# Patient Record
Sex: FEMALE | Race: WHITE | NOT HISPANIC OR LATINO | Employment: STUDENT | URBAN - METROPOLITAN AREA
[De-identification: names, ages, dates, MRNs, and addresses within clinical notes are randomized per-mention and may not be internally consistent; named-entity substitution may affect disease eponyms.]

---

## 2018-08-16 ENCOUNTER — OFFICE VISIT (OUTPATIENT)
Dept: FAMILY MEDICINE | Facility: CLINIC | Age: 18
End: 2018-08-16
Payer: COMMERCIAL

## 2018-08-16 ENCOUNTER — LAB VISIT (OUTPATIENT)
Dept: LAB | Facility: HOSPITAL | Age: 18
End: 2018-08-16
Attending: NURSE PRACTITIONER
Payer: COMMERCIAL

## 2018-08-16 VITALS
TEMPERATURE: 98 F | WEIGHT: 156.75 LBS | DIASTOLIC BLOOD PRESSURE: 78 MMHG | HEART RATE: 96 BPM | HEIGHT: 65 IN | BODY MASS INDEX: 26.12 KG/M2 | SYSTOLIC BLOOD PRESSURE: 126 MMHG

## 2018-08-16 DIAGNOSIS — Z00.00 ANNUAL PHYSICAL EXAM: Primary | ICD-10-CM

## 2018-08-16 DIAGNOSIS — F41.1 GAD (GENERALIZED ANXIETY DISORDER): ICD-10-CM

## 2018-08-16 DIAGNOSIS — Z00.00 ANNUAL PHYSICAL EXAM: ICD-10-CM

## 2018-08-16 LAB
ALBUMIN SERPL BCP-MCNC: 4.2 G/DL
ALP SERPL-CCNC: 71 U/L
ALT SERPL W/O P-5'-P-CCNC: 11 U/L
ANION GAP SERPL CALC-SCNC: 9 MMOL/L
AST SERPL-CCNC: 16 U/L
BASOPHILS # BLD AUTO: 0.04 K/UL
BASOPHILS NFR BLD: 0.8 %
BILIRUB SERPL-MCNC: 0.4 MG/DL
BUN SERPL-MCNC: 10 MG/DL
CALCIUM SERPL-MCNC: 9.6 MG/DL
CHLORIDE SERPL-SCNC: 108 MMOL/L
CHOLEST SERPL-MCNC: 206 MG/DL
CHOLEST/HDLC SERPL: 4 {RATIO}
CO2 SERPL-SCNC: 23 MMOL/L
CREAT SERPL-MCNC: 0.8 MG/DL
DIFFERENTIAL METHOD: NORMAL
EOSINOPHIL # BLD AUTO: 0.2 K/UL
EOSINOPHIL NFR BLD: 3.2 %
ERYTHROCYTE [DISTWIDTH] IN BLOOD BY AUTOMATED COUNT: 13.2 %
EST. GFR  (AFRICAN AMERICAN): >60 ML/MIN/1.73 M^2
EST. GFR  (NON AFRICAN AMERICAN): >60 ML/MIN/1.73 M^2
GLUCOSE SERPL-MCNC: 100 MG/DL
HCT VFR BLD AUTO: 41.3 %
HDLC SERPL-MCNC: 51 MG/DL
HDLC SERPL: 24.8 %
HGB BLD-MCNC: 13.6 G/DL
IMM GRANULOCYTES # BLD AUTO: 0.01 K/UL
IMM GRANULOCYTES NFR BLD AUTO: 0.2 %
LDLC SERPL CALC-MCNC: 146.2 MG/DL
LYMPHOCYTES # BLD AUTO: 2 K/UL
LYMPHOCYTES NFR BLD: 37.2 %
MCH RBC QN AUTO: 28.7 PG
MCHC RBC AUTO-ENTMCNC: 32.9 G/DL
MCV RBC AUTO: 87 FL
MONOCYTES # BLD AUTO: 0.4 K/UL
MONOCYTES NFR BLD: 7.9 %
NEUTROPHILS # BLD AUTO: 2.7 K/UL
NEUTROPHILS NFR BLD: 50.7 %
NONHDLC SERPL-MCNC: 155 MG/DL
NRBC BLD-RTO: 0 /100 WBC
PLATELET # BLD AUTO: 299 K/UL
PMV BLD AUTO: 9.4 FL
POTASSIUM SERPL-SCNC: 3.9 MMOL/L
PROT SERPL-MCNC: 7.4 G/DL
RBC # BLD AUTO: 4.74 M/UL
SODIUM SERPL-SCNC: 140 MMOL/L
TRIGL SERPL-MCNC: 44 MG/DL
WBC # BLD AUTO: 5.32 K/UL

## 2018-08-16 PROCEDURE — 99385 PREV VISIT NEW AGE 18-39: CPT | Mod: S$GLB,,, | Performed by: NURSE PRACTITIONER

## 2018-08-16 PROCEDURE — 80061 LIPID PANEL: CPT

## 2018-08-16 PROCEDURE — 80053 COMPREHEN METABOLIC PANEL: CPT

## 2018-08-16 PROCEDURE — 36415 COLL VENOUS BLD VENIPUNCTURE: CPT | Mod: PO

## 2018-08-16 PROCEDURE — 85025 COMPLETE CBC W/AUTO DIFF WBC: CPT

## 2018-08-16 PROCEDURE — 99999 PR PBB SHADOW E&M-NEW PATIENT-LVL III: CPT | Mod: PBBFAC,,, | Performed by: NURSE PRACTITIONER

## 2018-08-16 RX ORDER — ESCITALOPRAM OXALATE 5 MG/1
5 TABLET ORAL DAILY
Qty: 30 TABLET | Refills: 0 | Status: SHIPPED | OUTPATIENT
Start: 2018-08-16 | End: 2018-09-20 | Stop reason: ALTCHOICE
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
## 2018-09-20 ENCOUNTER — OFFICE VISIT (OUTPATIENT)
Dept: FAMILY MEDICINE | Facility: CLINIC | Age: 18
End: 2018-09-20
Payer: COMMERCIAL

## 2018-09-20 VITALS
DIASTOLIC BLOOD PRESSURE: 79 MMHG | SYSTOLIC BLOOD PRESSURE: 121 MMHG | HEART RATE: 73 BPM | WEIGHT: 151.88 LBS | TEMPERATURE: 98 F | BODY MASS INDEX: 25.3 KG/M2 | HEIGHT: 65 IN

## 2018-09-20 DIAGNOSIS — F32.A ANXIETY AND DEPRESSION: Primary | ICD-10-CM

## 2018-09-20 DIAGNOSIS — F41.9 ANXIETY AND DEPRESSION: Primary | ICD-10-CM

## 2018-09-20 PROCEDURE — 99214 OFFICE O/P EST MOD 30 MIN: CPT | Mod: S$GLB,,, | Performed by: NURSE PRACTITIONER

## 2018-09-20 PROCEDURE — 3008F BODY MASS INDEX DOCD: CPT | Mod: CPTII,S$GLB,, | Performed by: NURSE PRACTITIONER

## 2018-09-20 PROCEDURE — 99999 PR PBB SHADOW E&M-EST. PATIENT-LVL III: CPT | Mod: PBBFAC,,, | Performed by: NURSE PRACTITIONER

## 2018-09-20 RX ORDER — SERTRALINE HYDROCHLORIDE 50 MG/1
50 TABLET, FILM COATED ORAL DAILY
Qty: 30 TABLET | Refills: 0 | Status: SHIPPED | OUTPATIENT
Start: 2018-09-20 | End: 2018-10-19 | Stop reason: SDUPTHER

## 2018-09-20 NOTE — PROGRESS NOTES
Subjective:       Patient ID: Mary Jones is a 18 y.o. female.    Chief Complaint: Anxiety    Ms. Jones presents to the clinic today for one month follow up for anxiety.  She started Lexapro and felt this helped with anxiety, but depression has worsened.  She also felt the Lexapro caused a little lightheadedness.  She is still in therapy.  She denies suicidal ideation.  She is exercising by walking.  She has no other complaints today.      Review of Systems   Respiratory: Negative for cough and shortness of breath.    Cardiovascular: Negative for chest pain, palpitations and leg swelling.   Gastrointestinal: Negative for abdominal pain, constipation and diarrhea.   Psychiatric/Behavioral: Positive for dysphoric mood. Negative for suicidal ideas. The patient is nervous/anxious (improved).        Objective:      Physical Exam   Constitutional: She is oriented to person, place, and time. She appears well-nourished. No distress.   HENT:   Head: Normocephalic and atraumatic.   Right Ear: External ear normal.   Left Ear: External ear normal.   Mouth/Throat: Oropharynx is clear and moist. No oropharyngeal exudate.   Eyes: Pupils are equal, round, and reactive to light. Right eye exhibits no discharge. Left eye exhibits no discharge.   Cardiovascular: Normal rate and regular rhythm. Exam reveals no gallop and no friction rub.   No murmur heard.  Pulmonary/Chest: Effort normal and breath sounds normal. No respiratory distress. She has no wheezes. She has no rales.   Neurological: She is alert and oriented to person, place, and time. Coordination normal.   Skin: Skin is warm and dry.   Psychiatric: She has a normal mood and affect. Her behavior is normal. Thought content normal.   Vitals reviewed.          Current Outpatient Medications:     sertraline (ZOLOFT) 50 MG tablet, Take 1 tablet (50 mg total) by mouth once daily., Disp: 30 tablet, Rfl: 0  Assessment:       1. Anxiety and depression        Plan:        Anxiety and depression  Due to worsening depression will switch to Zoloft.  RTC 1 month.  -     sertraline (ZOLOFT) 50 MG tablet; Take 1 tablet (50 mg total) by mouth once daily.  Dispense: 30 tablet; Refill: 0

## 2018-10-19 ENCOUNTER — OFFICE VISIT (OUTPATIENT)
Dept: FAMILY MEDICINE | Facility: CLINIC | Age: 18
End: 2018-10-19
Payer: COMMERCIAL

## 2018-10-19 VITALS
BODY MASS INDEX: 25.2 KG/M2 | HEIGHT: 65 IN | TEMPERATURE: 98 F | WEIGHT: 151.25 LBS | DIASTOLIC BLOOD PRESSURE: 73 MMHG | SYSTOLIC BLOOD PRESSURE: 108 MMHG | HEART RATE: 91 BPM

## 2018-10-19 DIAGNOSIS — F41.9 ANXIETY AND DEPRESSION: ICD-10-CM

## 2018-10-19 DIAGNOSIS — F32.A ANXIETY AND DEPRESSION: ICD-10-CM

## 2018-10-19 PROCEDURE — 99213 OFFICE O/P EST LOW 20 MIN: CPT | Mod: S$GLB,,, | Performed by: NURSE PRACTITIONER

## 2018-10-19 PROCEDURE — 3008F BODY MASS INDEX DOCD: CPT | Mod: CPTII,S$GLB,, | Performed by: NURSE PRACTITIONER

## 2018-10-19 PROCEDURE — 99999 PR PBB SHADOW E&M-EST. PATIENT-LVL III: CPT | Mod: PBBFAC,,, | Performed by: NURSE PRACTITIONER

## 2018-10-19 RX ORDER — SERTRALINE HYDROCHLORIDE 50 MG/1
50 TABLET, FILM COATED ORAL DAILY
Qty: 90 TABLET | Refills: 3 | Status: SHIPPED | OUTPATIENT
Start: 2018-10-19 | End: 2018-12-12

## 2018-10-19 NOTE — PROGRESS NOTES
Subjective:       Patient ID: Mary Jones is a 18 y.o. female.    Chief Complaint: Anxiety    Ms. Jones presents to the clinic today for one month follow up after starting sertraline for anxiety and depression.  She reports she is feeling much better with this medication than the escitalopram.  She states anxiety and depression are both controlled.  She gets lightheaded occasionally since starting the medication but she can control this by getting up more slowly.  She has no other side effects.  She would like to continue this dosage. Her therapist can no longer see her and she is searching for a new therapist.  Denies vaccines today.      Review of Systems   Constitutional: Negative for chills and fever.   Respiratory: Negative for cough and shortness of breath.    Cardiovascular: Negative for chest pain, palpitations and leg swelling.   Psychiatric/Behavioral: Negative for dysphoric mood. The patient is not nervous/anxious.        Objective:      Physical Exam   Constitutional: She is oriented to person, place, and time. She appears well-developed and well-nourished. No distress.   HENT:   Head: Normocephalic and atraumatic.   Right Ear: External ear normal.   Left Ear: External ear normal.   Eyes: Conjunctivae and EOM are normal.   Cardiovascular: Normal rate, regular rhythm and normal heart sounds. Exam reveals no gallop and no friction rub.   No murmur heard.  Pulmonary/Chest: Effort normal and breath sounds normal. No stridor. No respiratory distress. She has no wheezes.   Musculoskeletal: Normal range of motion.   Neurological: She is alert and oriented to person, place, and time.   Skin: Skin is warm and dry.   Psychiatric: She has a normal mood and affect. Her behavior is normal.   Vitals reviewed.          Current Outpatient Medications:     sertraline (ZOLOFT) 50 MG tablet, Take 1 tablet (50 mg total) by mouth once daily., Disp: 90 tablet, Rfl: 3  Assessment:       1. Anxiety and depression         Plan:       Anxiety and depression  Controlled, continue current medication.  Follow up 6mos--can consider weaning off medication or stay on this long term.  -     sertraline (ZOLOFT) 50 MG tablet; Take 1 tablet (50 mg total) by mouth once daily.  Dispense: 90 tablet; Refill: 3

## 2018-12-10 ENCOUNTER — TELEPHONE (OUTPATIENT)
Dept: FAMILY MEDICINE | Facility: CLINIC | Age: 18
End: 2018-12-10

## 2018-12-10 NOTE — TELEPHONE ENCOUNTER
----- Message from Alison Zuniga sent at 12/10/2018  4:40 PM CST -----    Type:  Same Day Appointment Request    Caller is requesting a same day appointment.  Caller declined first available appointment listed below.      Name of Caller:pt  When is the first available appointment?   12/19/2018  Symptoms:  Med    Side  Affects  For zoloft  Best Call Back Number:  [phone]  Additional Information:     Pt  Stated she only  wants to  See this  No  only

## 2018-12-10 NOTE — TELEPHONE ENCOUNTER
Patient has complaints of diarrhea and worsening depression that has gotten worse over time. Patient states she did not stop taking the medication.   Advised patient will speak to facundo and f/u   Appointment available on Wednesday left message for patient to return call

## 2018-12-11 NOTE — TELEPHONE ENCOUNTER
Spoke to patient. Patient states she does not have suicidal thoughts patient declines to come in today to see Miroslava appointment scheduled with facundo ANDINO for 12/12

## 2018-12-12 ENCOUNTER — OFFICE VISIT (OUTPATIENT)
Dept: FAMILY MEDICINE | Facility: CLINIC | Age: 18
End: 2018-12-12
Payer: COMMERCIAL

## 2018-12-12 VITALS
BODY MASS INDEX: 25.2 KG/M2 | HEIGHT: 65 IN | DIASTOLIC BLOOD PRESSURE: 79 MMHG | SYSTOLIC BLOOD PRESSURE: 122 MMHG | HEART RATE: 98 BPM | WEIGHT: 151.25 LBS | TEMPERATURE: 98 F

## 2018-12-12 DIAGNOSIS — F41.9 ANXIETY AND DEPRESSION: ICD-10-CM

## 2018-12-12 DIAGNOSIS — F32.A ANXIETY AND DEPRESSION: ICD-10-CM

## 2018-12-12 PROCEDURE — 3008F BODY MASS INDEX DOCD: CPT | Mod: CPTII,S$GLB,, | Performed by: NURSE PRACTITIONER

## 2018-12-12 PROCEDURE — 99999 PR PBB SHADOW E&M-EST. PATIENT-LVL IV: CPT | Mod: PBBFAC,,, | Performed by: NURSE PRACTITIONER

## 2018-12-12 PROCEDURE — 99214 OFFICE O/P EST MOD 30 MIN: CPT | Mod: S$GLB,,, | Performed by: NURSE PRACTITIONER

## 2018-12-12 RX ORDER — SERTRALINE HYDROCHLORIDE 100 MG/1
100 TABLET, FILM COATED ORAL DAILY
Qty: 90 TABLET | Refills: 3 | Status: SHIPPED | OUTPATIENT
Start: 2018-12-12 | End: 2019-01-09

## 2018-12-12 NOTE — PROGRESS NOTES
Subjective:       Patient ID: Mary Jones is a 18 y.o. female.    Chief Complaint: Depression    Ms. Jones presents to the clinic today for worsening depression over the past month.  She reports anxiety is controlled but depression has worsened.  No inciting event.  She has had thought of hurting self but would never act on it.  She is staying busy with work and school.  She tried to find a therapist in [place] but was unsuccessful.  She is willing to drive to main campus if needed.      Review of Systems   Constitutional: Negative for chills and fever.   Respiratory: Negative for cough and shortness of breath.    Cardiovascular: Negative for chest pain, palpitations and leg swelling.   Gastrointestinal: Negative for abdominal pain, constipation and diarrhea.   Psychiatric/Behavioral: Positive for dysphoric mood. The patient is not nervous/anxious.        Objective:      Physical Exam   Constitutional: She is oriented to person, place, and time. She appears well-nourished. No distress.   HENT:   Head: Normocephalic and atraumatic.   Right Ear: External ear normal.   Left Ear: External ear normal.   Mouth/Throat: Oropharynx is clear and moist. No oropharyngeal exudate.   Eyes: Right eye exhibits no discharge. Left eye exhibits no discharge.   Cardiovascular: Normal rate and regular rhythm. Exam reveals no gallop and no friction rub.   No murmur heard.  Pulmonary/Chest: Effort normal and breath sounds normal. No respiratory distress. She has no wheezes. She has no rales.   Neurological: She is alert and oriented to person, place, and time. Coordination normal.   Skin: Skin is warm and dry.   Psychiatric: Her behavior is normal. Thought content normal. Her mood appears not anxious. She exhibits a depressed mood (tearful). She expresses no suicidal plans.   Vitals reviewed.          Current Outpatient Medications:     sertraline (ZOLOFT) 100 MG tablet, Take 1 tablet (100 mg total) by mouth once daily., Disp:  90 tablet, Rfl: 3  Assessment:       1. Anxiety and depression        Plan:       Anxiety and depression  She will seek therapy at main campus.  Increase Zoloft, RTC 4 wk or sooner PRN.  -     sertraline (ZOLOFT) 100 MG tablet; Take 1 tablet (100 mg total) by mouth once daily.  Dispense: 90 tablet; Refill: 3  -     Ambulatory referral to Psychology

## 2019-01-09 ENCOUNTER — OFFICE VISIT (OUTPATIENT)
Dept: FAMILY MEDICINE | Facility: CLINIC | Age: 19
End: 2019-01-09
Payer: COMMERCIAL

## 2019-01-09 VITALS
TEMPERATURE: 98 F | SYSTOLIC BLOOD PRESSURE: 104 MMHG | HEART RATE: 78 BPM | BODY MASS INDEX: 24.75 KG/M2 | HEIGHT: 65 IN | WEIGHT: 148.56 LBS | DIASTOLIC BLOOD PRESSURE: 74 MMHG

## 2019-01-09 DIAGNOSIS — F41.9 ANXIETY AND DEPRESSION: Primary | ICD-10-CM

## 2019-01-09 DIAGNOSIS — F32.A ANXIETY AND DEPRESSION: Primary | ICD-10-CM

## 2019-01-09 PROCEDURE — 99213 PR OFFICE/OUTPT VISIT, EST, LEVL III, 20-29 MIN: ICD-10-PCS | Mod: S$GLB,,, | Performed by: NURSE PRACTITIONER

## 2019-01-09 PROCEDURE — 3008F BODY MASS INDEX DOCD: CPT | Mod: CPTII,S$GLB,, | Performed by: NURSE PRACTITIONER

## 2019-01-09 PROCEDURE — 99999 PR PBB SHADOW E&M-EST. PATIENT-LVL III: CPT | Mod: PBBFAC,,, | Performed by: NURSE PRACTITIONER

## 2019-01-09 PROCEDURE — 99213 OFFICE O/P EST LOW 20 MIN: CPT | Mod: S$GLB,,, | Performed by: NURSE PRACTITIONER

## 2019-01-09 PROCEDURE — 3008F PR BODY MASS INDEX (BMI) DOCUMENTED: ICD-10-PCS | Mod: CPTII,S$GLB,, | Performed by: NURSE PRACTITIONER

## 2019-01-09 PROCEDURE — 99999 PR PBB SHADOW E&M-EST. PATIENT-LVL III: ICD-10-PCS | Mod: PBBFAC,,, | Performed by: NURSE PRACTITIONER

## 2019-01-09 RX ORDER — FLUOXETINE 10 MG/1
CAPSULE ORAL
Qty: 60 CAPSULE | Refills: 2 | Status: SHIPPED | OUTPATIENT
Start: 2019-01-09 | End: 2019-02-06

## 2019-01-09 NOTE — PROGRESS NOTES
Subjective:       Patient ID: Mary Jones is a 18 y.o. female.    Chief Complaint: Follow-up    Ms. Jones presents to the clinic today for follow up for anxiety and depression.  She stopped Zoloft one week ago due to side effects of diarrhea, trouble sleeping.  She would like to try a different medication.  She felt the Zoloft helped her anxiety but not depression.  Currently denies SI.  She has seen a couple therapists but has not chosen one long term.  Started school this week--taking 18 hours this semester.      Review of Systems   Gastrointestinal: Positive for diarrhea (on zoloft). Negative for constipation.   Psychiatric/Behavioral: Positive for dysphoric mood and sleep disturbance. Negative for self-injury and suicidal ideas. The patient is nervous/anxious.        Objective:      Physical Exam   Constitutional: She is oriented to person, place, and time. She appears well-nourished. No distress.   HENT:   Head: Normocephalic and atraumatic.   Right Ear: External ear normal.   Left Ear: External ear normal.   Eyes: Right eye exhibits no discharge. Left eye exhibits no discharge.   Cardiovascular: Normal rate and regular rhythm. Exam reveals no gallop and no friction rub.   No murmur heard.  Pulmonary/Chest: Effort normal and breath sounds normal. No respiratory distress. She has no wheezes. She has no rales.   Neurological: She is alert and oriented to person, place, and time. Coordination normal.   Skin: Skin is warm and dry.   Psychiatric: She has a normal mood and affect. Her behavior is normal. Thought content normal.   Vitals reviewed.          Current Outpatient Medications:     FLUoxetine 10 MG capsule, Take 1 cap by mouth daily x 1 week, then increase to 2 caps by mouth daily, Disp: 60 capsule, Rfl: 2  Assessment:       1. Anxiety and depression        Plan:       Anxiety and depression  Start:  -     FLUoxetine 10 MG capsule; Take 1 cap by mouth daily x 1 week, then increase to 2 caps by  mouth daily  Dispense: 60 capsule; Refill: 2  RTC 4 weeks.

## 2019-02-06 ENCOUNTER — OFFICE VISIT (OUTPATIENT)
Dept: FAMILY MEDICINE | Facility: CLINIC | Age: 19
End: 2019-02-06
Payer: COMMERCIAL

## 2019-02-06 VITALS
WEIGHT: 148.13 LBS | SYSTOLIC BLOOD PRESSURE: 107 MMHG | TEMPERATURE: 98 F | DIASTOLIC BLOOD PRESSURE: 73 MMHG | HEIGHT: 65 IN | BODY MASS INDEX: 24.68 KG/M2 | HEART RATE: 96 BPM

## 2019-02-06 DIAGNOSIS — F32.A ANXIETY AND DEPRESSION: ICD-10-CM

## 2019-02-06 DIAGNOSIS — F41.9 ANXIETY AND DEPRESSION: ICD-10-CM

## 2019-02-06 PROCEDURE — 99999 PR PBB SHADOW E&M-EST. PATIENT-LVL III: ICD-10-PCS | Mod: PBBFAC,,, | Performed by: NURSE PRACTITIONER

## 2019-02-06 PROCEDURE — 3008F PR BODY MASS INDEX (BMI) DOCUMENTED: ICD-10-PCS | Mod: CPTII,S$GLB,, | Performed by: NURSE PRACTITIONER

## 2019-02-06 PROCEDURE — 99214 OFFICE O/P EST MOD 30 MIN: CPT | Mod: S$GLB,,, | Performed by: NURSE PRACTITIONER

## 2019-02-06 PROCEDURE — 3008F BODY MASS INDEX DOCD: CPT | Mod: CPTII,S$GLB,, | Performed by: NURSE PRACTITIONER

## 2019-02-06 PROCEDURE — 99214 PR OFFICE/OUTPT VISIT, EST, LEVL IV, 30-39 MIN: ICD-10-PCS | Mod: S$GLB,,, | Performed by: NURSE PRACTITIONER

## 2019-02-06 PROCEDURE — 99999 PR PBB SHADOW E&M-EST. PATIENT-LVL III: CPT | Mod: PBBFAC,,, | Performed by: NURSE PRACTITIONER

## 2019-02-06 RX ORDER — FLUOXETINE HYDROCHLORIDE 40 MG/1
40 CAPSULE ORAL DAILY
Qty: 30 CAPSULE | Refills: 2 | Status: SHIPPED | OUTPATIENT
Start: 2019-02-06 | End: 2019-04-22

## 2019-02-06 NOTE — PROGRESS NOTES
Subjective:       Patient ID: Mary Jones is a 18 y.o. female.    Chief Complaint: Follow-up    Ms. Jones presents to the clinic today for four week follow up for anxiety and depression.  She is doing well with the Prozac but feels the dosage could be increased.  She still has some anxiety and depression. No side effects from the medication other than occasional lightheadedness but she had this before taking the medicine.  She denies SI/HI.  She is meeting with a new therapist coming up soon.  She is still going to school in [place].  She will be moving into a studio apartment soon, moving out of her parents' house for the first time.      Review of Systems   Constitutional: Negative for chills and fever.   Respiratory: Negative for cough and shortness of breath.    Cardiovascular: Negative for chest pain, palpitations and leg swelling.   Psychiatric/Behavioral: Positive for dysphoric mood. Negative for self-injury and suicidal ideas. The patient is nervous/anxious.        Objective:      Physical Exam   Constitutional: She is oriented to person, place, and time. She appears well-developed and well-nourished. No distress.   HENT:   Head: Normocephalic and atraumatic.   Right Ear: External ear normal.   Left Ear: External ear normal.   Eyes: Conjunctivae and EOM are normal.   Cardiovascular: Normal rate, regular rhythm and normal heart sounds. Exam reveals no gallop and no friction rub.   No murmur heard.  Pulmonary/Chest: Effort normal and breath sounds normal. No stridor. No respiratory distress. She has no wheezes. She has no rales.   Musculoskeletal: Normal range of motion.   Neurological: She is alert and oriented to person, place, and time.   Skin: Skin is warm and dry.   Psychiatric: She has a normal mood and affect. Her behavior is normal.   Vitals reviewed.          Current Outpatient Medications:     FLUoxetine 40 MG capsule, Take 1 capsule (40 mg total) by mouth once daily., Disp: 30  capsule, Rfl: 2  Assessment:       1. Anxiety and depression        Plan:       Anxiety and depression  Increase:  -     FLUoxetine 40 MG capsule; Take 1 capsule (40 mg total) by mouth once daily.  Dispense: 30 capsule; Refill: 2  RTC 4-6 weeks

## 2019-03-21 ENCOUNTER — OFFICE VISIT (OUTPATIENT)
Dept: FAMILY MEDICINE | Facility: CLINIC | Age: 19
End: 2019-03-21
Payer: COMMERCIAL

## 2019-03-21 VITALS
HEIGHT: 65 IN | WEIGHT: 154.56 LBS | DIASTOLIC BLOOD PRESSURE: 76 MMHG | HEART RATE: 83 BPM | BODY MASS INDEX: 25.75 KG/M2 | TEMPERATURE: 98 F | SYSTOLIC BLOOD PRESSURE: 113 MMHG

## 2019-03-21 DIAGNOSIS — Z28.39 IMMUNIZATION DEFICIENCY: ICD-10-CM

## 2019-03-21 DIAGNOSIS — F32.A ANXIETY AND DEPRESSION: Primary | ICD-10-CM

## 2019-03-21 DIAGNOSIS — F41.9 ANXIETY AND DEPRESSION: Primary | ICD-10-CM

## 2019-03-21 PROCEDURE — 90651 HPV VACCINE 9-VALENT 3 DOSE IM: ICD-10-PCS | Mod: S$GLB,,, | Performed by: NURSE PRACTITIONER

## 2019-03-21 PROCEDURE — 99999 PR PBB SHADOW E&M-EST. PATIENT-LVL IV: ICD-10-PCS | Mod: PBBFAC,,, | Performed by: NURSE PRACTITIONER

## 2019-03-21 PROCEDURE — 3008F PR BODY MASS INDEX (BMI) DOCUMENTED: ICD-10-PCS | Mod: CPTII,S$GLB,, | Performed by: NURSE PRACTITIONER

## 2019-03-21 PROCEDURE — 3008F BODY MASS INDEX DOCD: CPT | Mod: CPTII,S$GLB,, | Performed by: NURSE PRACTITIONER

## 2019-03-21 PROCEDURE — 99999 PR PBB SHADOW E&M-EST. PATIENT-LVL IV: CPT | Mod: PBBFAC,,, | Performed by: NURSE PRACTITIONER

## 2019-03-21 PROCEDURE — 90471 IMMUNIZATION ADMIN: CPT | Mod: S$GLB,,, | Performed by: NURSE PRACTITIONER

## 2019-03-21 PROCEDURE — 90471 HPV VACCINE 9-VALENT 3 DOSE IM: ICD-10-PCS | Mod: S$GLB,,, | Performed by: NURSE PRACTITIONER

## 2019-03-21 PROCEDURE — 99213 PR OFFICE/OUTPT VISIT, EST, LEVL III, 20-29 MIN: ICD-10-PCS | Mod: 25,S$GLB,, | Performed by: NURSE PRACTITIONER

## 2019-03-21 PROCEDURE — 90651 9VHPV VACCINE 2/3 DOSE IM: CPT | Mod: S$GLB,,, | Performed by: NURSE PRACTITIONER

## 2019-03-21 PROCEDURE — 99213 OFFICE O/P EST LOW 20 MIN: CPT | Mod: 25,S$GLB,, | Performed by: NURSE PRACTITIONER

## 2019-03-21 RX ORDER — QUETIAPINE FUMARATE 50 MG/1
50 TABLET, FILM COATED ORAL NIGHTLY
Qty: 30 TABLET | Refills: 11 | Status: SHIPPED | OUTPATIENT
Start: 2019-03-21 | End: 2019-03-28

## 2019-03-21 NOTE — PROGRESS NOTES
"Subjective:       Patient ID: Mary Jones is a 18 y.o. female.    Chief Complaint: Anxiety    Ms. Jones presents to the clinic today for follow up for anxiety and depression.  She feels the fluoxetine helps well with her anxiety but still not helping with the depression.  Denies SI/HI.  She is seeing a psychotherapist who thinks she may have bipolar disorder. She admits to fluctuating moods and these can change quickly over one day or several days. She will be "high" for a couple days and then crash.  No impulsive shopping/gambling.  She does have "intrusive thoughts".  She wants to see if she can get a definitive diagnosis.      Review of Systems   Constitutional: Negative for chills and fever.   Respiratory: Negative for cough and shortness of breath.    Cardiovascular: Negative for chest pain, palpitations and leg swelling.   Psychiatric/Behavioral: Positive for dysphoric mood. Negative for self-injury and suicidal ideas. The patient is not nervous/anxious.        Objective:      Physical Exam   Constitutional: She is oriented to person, place, and time. She appears well-developed and well-nourished. No distress.   HENT:   Head: Normocephalic and atraumatic.   Right Ear: External ear normal.   Left Ear: External ear normal.   Eyes: EOM are normal.   Cardiovascular: Normal rate.   Pulmonary/Chest: Effort normal.   Musculoskeletal: Normal range of motion.   Neurological: She is alert and oriented to person, place, and time.   Skin: Skin is warm and dry.   Psychiatric: She has a normal mood and affect. Her behavior is normal.   Vitals reviewed.          Current Outpatient Medications:     FLUoxetine 40 MG capsule, Take 1 capsule (40 mg total) by mouth once daily., Disp: 30 capsule, Rfl: 2    QUEtiapine (SEROQUEL) 50 MG tablet, Take 1 tablet (50 mg total) by mouth every evening., Disp: 30 tablet, Rfl: 11  Assessment:       1. Anxiety and depression    2. Immunization deficiency        Plan:       Anxiety " and depression  Continue fluoxetine, add quetiapine.  Handout given with possible side effects.  -     QUEtiapine (SEROQUEL) 50 MG tablet; Take 1 tablet (50 mg total) by mouth every evening.  Dispense: 30 tablet; Refill: 11  -     Ambulatory referral to Psychiatry    Immunization deficiency  -     (In Office Administered) HPV Vaccine (9-Valent) (3 Dose) (IM); Future  -     (In Office Administered) HPV Vaccine (9-Valent) (3 Dose) (IM); Future  -     (In Office Administered) HPV Vaccine (9-Valent) (3 Dose) (IM)      Establish care 6 mos with PCP

## 2019-03-21 NOTE — PATIENT INSTRUCTIONS
Quetiapine tablets  What is this medicine?  QUETIAPINE (yinka villalta) is an antipsychotic. It is used to treat schizophrenia and bipolar disorder, also known as manic-depression.  How should I use this medicine?  Take this medicine by mouth. Swallow it with a drink of water. Follow the directions on the prescription label. If it upsets your stomach you can take it with food. Take your medicine at regular intervals. Do not take it more often than directed. Do not stop taking except on the advice of your doctor or health care professional.  A special MedGuide will be given to you by the pharmacist with each prescription and refill. Be sure to read this information carefully each time.  Talk to your pediatrician regarding the use of this medicine in children. While this drug may be prescribed for children as young as 10 years for selected conditions, precautions do apply.  Patients over age 65 years may have a stronger reaction to this medicine and need smaller doses.  What side effects may I notice from receiving this medicine?  Side effects that you should report to your doctor or health care professional as soon as possible:  · allergic reactions like skin rash, itching or hives, swelling of the face, lips, or tongue  · difficulty swallowing  · fast or irregular heartbeat  · fever or chills, sore throat  · fever with rash, swollen lymph nodes, or swelling of the face  · increased hunger or thirst  · increased urination  · problems with balance, talking, walking  · seizures  · stiff muscles  · suicidal thoughts or other mood changes  · uncontrollable head, mouth, neck, arm, or leg movements  · unusually weak or tired  Side effects that usually do not require medical attention (report to your doctor or health care professional if they continue or are bothersome):  · change in sex drive or performance  · constipation  · drowsy or dizzy  · dry mouth  · stomach upset  · weight gain  What may interact with this  medicine?  Do not take this medicine with any of the following medications:  · certain medicines for fungal infections like fluconazole, itraconazole, ketoconazole, posaconazole, voriconazole  · cisapride  · dofetilide  · dronedarone  · droperidol  · grepafloxacin  · halofantrine  · phenothiazines like chlorpromazine, mesoridazine, thioridazine  · pimozide  · sparfloxacin  · ziprasidone  This medicine may also interact with the following medications:  · alcohol  · antiviral medicines for HIV or AIDS  · certain medicines for blood pressure  · certain medicines for depression, anxiety, or psychotic disturbances like haloperidol, lorazepam  · certain medicines for diabetes  · certain medicines for Parkinson's disease  · certain medicines for seizures like carbamazepine, phenobarbital, phenytoin  · cimetidine  · erythromycin  · other medicines that prolong the QT interval (cause an abnormal heart rhythm)  · rifampin  · steroid medicines like prednisone or cortisone  What if I miss a dose?  If you miss a dose, take it as soon as you can. If it is almost time for your next dose, take only that dose. Do not take double or extra doses.  Where should I keep my medicine?  Keep out of the reach of children.  Store at room temperature between 15 and 30 degrees C (59 and 86 degrees F). Throw away any unused medicine after the expiration date.  What should I tell my health care provider before I take this medicine?  They need to know if you have any of these conditions:  · brain tumor or head injury  · breast cancer  · cataracts  · diabetes  · difficulty swallowing  · heart disease  · kidney disease  · liver disease  · low blood counts, like low white cell, platelet, or red cell counts  · low blood pressure or dizziness when standing up  · Parkinson's disease  · previous heart attack  · seizures  · suicidal thoughts, plans, or attempt by you or a family member  · thyroid disease  · an unusual or allergic reaction to quetiapine,  other medicines, foods, dyes, or preservatives  · pregnant or trying to get pregnant  · breast-feeding  What should I watch for while using this medicine?  Visit your doctor or health care professional for regular checks on your progress. It may be several weeks before you see the full effects of this medicine.  Your health care provider may suggest that you have your eyes examined prior to starting this medicine, and every 6 months thereafter.  If you have been taking this medicine regularly for some time, do not suddenly stop taking it. You must gradually reduce the dose or your symptoms may get worse. Ask your doctor or health care professional for advice.  Patients and their families should watch out for worsening depression or thoughts of suicide. Also watch out for sudden or severe changes in feelings such as feeling anxious, agitated, panicky, irritable, hostile, aggressive, impulsive, severely restless, overly excited and hyperactive, or not being able to sleep. If this happens, especially at the beginning of antidepressant treatment or after a change in dose, call your health care professional.  You may get dizzy or drowsy. Do not drive, use machinery, or do anything that needs mental alertness until you know how this medicine affects you. Do not stand or sit up quickly, especially if you are an older patient. This reduces the risk of dizzy or fainting spells. Alcohol can increase dizziness and drowsiness. Avoid alcoholic drinks.  Do not treat yourself for colds, diarrhea or allergies. Ask your doctor or health care professional for advice, some ingredients may increase possible side effects.  This medicine can reduce the response of your body to heat or cold. Dress warm in cold weather and stay hydrated in hot weather. If possible, avoid extreme temperatures like saunas, hot tubs, very hot or cold showers, or activities that can cause dehydration such as vigorous exercise.  NOTE:This sheet is a summary. It  may not cover all possible information. If you have questions about this medicine, talk to your doctor, pharmacist, or health care provider. Copyright© 2017 [employer]

## 2019-03-21 NOTE — PROGRESS NOTES
Administered hpv vaccine IM. Patient tolerated well. No bleeding at insertion site noted. Pain scale 0/10 with injection. 2 patient identifiers used (name, ), aseptic technique maintained.

## 2019-03-28 ENCOUNTER — OFFICE VISIT (OUTPATIENT)
Dept: PSYCHIATRY | Facility: CLINIC | Age: 19
End: 2019-03-28
Payer: COMMERCIAL

## 2019-03-28 ENCOUNTER — PATIENT MESSAGE (OUTPATIENT)
Dept: PSYCHIATRY | Facility: CLINIC | Age: 19
End: 2019-03-28

## 2019-03-28 ENCOUNTER — TELEPHONE (OUTPATIENT)
Dept: FAMILY MEDICINE | Facility: CLINIC | Age: 19
End: 2019-03-28

## 2019-03-28 ENCOUNTER — LAB VISIT (OUTPATIENT)
Dept: LAB | Facility: HOSPITAL | Age: 19
End: 2019-03-28
Attending: PSYCHOLOGIST
Payer: COMMERCIAL

## 2019-03-28 VITALS
BODY MASS INDEX: 25.42 KG/M2 | DIASTOLIC BLOOD PRESSURE: 76 MMHG | HEIGHT: 65 IN | WEIGHT: 152.56 LBS | HEART RATE: 84 BPM | SYSTOLIC BLOOD PRESSURE: 112 MMHG | OXYGEN SATURATION: 98 % | TEMPERATURE: 98 F

## 2019-03-28 DIAGNOSIS — F31.31 BIPOLAR AFFECTIVE DISORDER, CURRENTLY DEPRESSED, MILD: ICD-10-CM

## 2019-03-28 DIAGNOSIS — F31.31 BIPOLAR AFFECTIVE DISORDER, CURRENTLY DEPRESSED, MILD: Primary | ICD-10-CM

## 2019-03-28 PROBLEM — F31.30 BIPOLAR DISORDER CURRENT EPISODE DEPRESSED: Status: ACTIVE | Noted: 2019-03-28

## 2019-03-28 LAB
AMP D-AMPHETAMINE 1000 NG/ML: NEGATIVE
BAR SECOBARBITAL 300 NG/ML: NEGATIVE
BUP BUPRENORPHINE 10 NG/ML: NEGATIVE
BZO OXAZEPAM 300 NG/ML: NEGATIVE
COC BENZOYLECGONINE 300 NG/ML: NEGATIVE
MAMP D-METHAMPHETAMINE 1000 NG/ML: NEGATIVE
MOP MORPHINE 300 NG/ML: NEGATIVE
MTD METHADONE 300 NG/ML: NEGATIVE
QXY OXYCODONE 100 NG/ML: NEGATIVE
THC 11-NOR-9-TETRAHYDROCANNABINOL-9-CARBOXYLIC ACID: POSITIVE
TSH SERPL DL<=0.005 MIU/L-ACNC: 1.59 UIU/ML (ref 0.4–4)

## 2019-03-28 PROCEDURE — 36415 COLL VENOUS BLD VENIPUNCTURE: CPT | Mod: PO

## 2019-03-28 PROCEDURE — 90791 PSYCH DIAGNOSTIC EVALUATION: CPT | Mod: S$GLB,,, | Performed by: PSYCHOLOGIST

## 2019-03-28 PROCEDURE — 90791 PR PSYCHIATRIC DIAGNOSTIC EVALUATION: ICD-10-PCS | Mod: S$GLB,,, | Performed by: PSYCHOLOGIST

## 2019-03-28 PROCEDURE — 99999 PR PBB SHADOW E&M-EST. PATIENT-LVL III: CPT | Mod: PBBFAC,,, | Performed by: PSYCHOLOGIST

## 2019-03-28 PROCEDURE — 80307 DRUG TEST PRSMV CHEM ANLYZR: CPT

## 2019-03-28 PROCEDURE — 84443 ASSAY THYROID STIM HORMONE: CPT

## 2019-03-28 PROCEDURE — 99999 PR PBB SHADOW E&M-EST. PATIENT-LVL III: ICD-10-PCS | Mod: PBBFAC,,, | Performed by: PSYCHOLOGIST

## 2019-03-28 RX ORDER — TRAZODONE HYDROCHLORIDE 100 MG/1
TABLET ORAL
Qty: 30 TABLET | Refills: 1 | Status: SHIPPED | OUTPATIENT
Start: 2019-03-28 | End: 2019-04-22 | Stop reason: SDUPTHER

## 2019-03-28 RX ORDER — LAMOTRIGINE 100 MG/1
TABLET ORAL
Qty: 30 TABLET | Refills: 1 | Status: SHIPPED | OUTPATIENT
Start: 2019-03-28 | End: 2019-04-22

## 2019-03-28 NOTE — PATIENT INSTRUCTIONS
Taper off Seroquel   Start Trazodone 100mg 1/2-1 tablet  Each night at bedtime  Start Lamictal 50 mg at bedtime for 6 nights then take 1 tablet every 12 hours  Return to clinic 4 weeks and as needed
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
## 2019-04-02 LAB
6MAM UR QL: NOT DETECTED
7AMINOCLONAZEPAM UR QL: NOT DETECTED
A-OH ALPRAZ UR QL: NOT DETECTED
ALPRAZ UR QL: NOT DETECTED
AMPHET UR QL SCN: NOT DETECTED
ANNOTATION COMMENT IMP: NORMAL
ANNOTATION COMMENT IMP: NORMAL
BARBITURATES UR QL: NOT DETECTED
BUPRENORPHINE UR QL: NOT DETECTED
BZE UR QL: NOT DETECTED
CARBOXYTHC UR QL: PRESENT
CARISOPRODOL UR QL: NOT DETECTED
CLONAZEPAM UR QL: NOT DETECTED
CODEINE UR QL: NOT DETECTED
CREAT UR-MCNC: 149.7 MG/DL (ref 20–400)
DIAZEPAM UR QL: NOT DETECTED
ETHYL GLUCURONIDE UR QL: NOT DETECTED
FENTANYL UR QL: NOT DETECTED
HYDROCODONE UR QL: NOT DETECTED
HYDROMORPHONE UR QL: NOT DETECTED
LORAZEPAM UR QL: NOT DETECTED
MDA UR QL: NOT DETECTED
MDEA UR QL: NOT DETECTED
MDMA UR QL: NOT DETECTED
ME-PHENIDATE UR QL: NOT DETECTED
MEPERIDINE UR QL: NOT DETECTED
METHADONE UR QL: NOT DETECTED
METHAMPHET UR QL: NOT DETECTED
MIDAZOLAM UR QL SCN: NOT DETECTED
MORPHINE UR QL: NOT DETECTED
NORBUPRENORPHINE UR QL CFM: NOT DETECTED
NORDIAZEPAM UR QL: NOT DETECTED
NORFENTANYL UR QL: NOT DETECTED
NORHYDROCODONE UR QL CFM: NOT DETECTED
NOROXYCODONE UR QL CFM: NOT DETECTED
NOROXYMORPHONE: NOT DETECTED
OXAZEPAM UR QL: NOT DETECTED
OXYCODONE UR QL: NOT DETECTED
OXYMORPHONE UR QL: NOT DETECTED
PATHOLOGY STUDY: NORMAL
PCP UR QL: NOT DETECTED
PHENTERMINE UR QL: NOT DETECTED
PROPOXYPH UR QL: NOT DETECTED
SERVICE CMNT-IMP: NORMAL
TAPENTADOL UR QL SCN: NOT DETECTED
TAPENTADOL-O-SULF: NOT DETECTED
TEMAZEPAM UR QL: NOT DETECTED
TRAMADOL UR QL: NOT DETECTED
ZOLPIDEM UR QL: NOT DETECTED

## 2019-04-22 ENCOUNTER — OFFICE VISIT (OUTPATIENT)
Dept: PSYCHIATRY | Facility: CLINIC | Age: 19
End: 2019-04-22
Payer: COMMERCIAL

## 2019-04-22 ENCOUNTER — PATIENT MESSAGE (OUTPATIENT)
Dept: PSYCHIATRY | Facility: CLINIC | Age: 19
End: 2019-04-22

## 2019-04-22 VITALS
SYSTOLIC BLOOD PRESSURE: 128 MMHG | HEART RATE: 93 BPM | HEIGHT: 65 IN | BODY MASS INDEX: 25.38 KG/M2 | WEIGHT: 152.31 LBS | TEMPERATURE: 98 F | RESPIRATION RATE: 14 BRPM | DIASTOLIC BLOOD PRESSURE: 83 MMHG

## 2019-04-22 DIAGNOSIS — F41.9 ANXIETY AND DEPRESSION: Primary | ICD-10-CM

## 2019-04-22 DIAGNOSIS — F32.A ANXIETY AND DEPRESSION: Primary | ICD-10-CM

## 2019-04-22 DIAGNOSIS — F31.32 BIPOLAR AFFECTIVE DISORDER, CURRENTLY DEPRESSED, MODERATE: ICD-10-CM

## 2019-04-22 PROCEDURE — 3008F PR BODY MASS INDEX (BMI) DOCUMENTED: ICD-10-PCS | Mod: CPTII,S$GLB,, | Performed by: PSYCHOLOGIST

## 2019-04-22 PROCEDURE — 99214 PR OFFICE/OUTPT VISIT, EST, LEVL IV, 30-39 MIN: ICD-10-PCS | Mod: S$GLB,,, | Performed by: PSYCHOLOGIST

## 2019-04-22 PROCEDURE — 99999 PR PBB SHADOW E&M-EST. PATIENT-LVL III: ICD-10-PCS | Mod: PBBFAC,,, | Performed by: PSYCHOLOGIST

## 2019-04-22 PROCEDURE — 3008F BODY MASS INDEX DOCD: CPT | Mod: CPTII,S$GLB,, | Performed by: PSYCHOLOGIST

## 2019-04-22 PROCEDURE — 99999 PR PBB SHADOW E&M-EST. PATIENT-LVL III: CPT | Mod: PBBFAC,,, | Performed by: PSYCHOLOGIST

## 2019-04-22 PROCEDURE — 99214 OFFICE O/P EST MOD 30 MIN: CPT | Mod: S$GLB,,, | Performed by: PSYCHOLOGIST

## 2019-04-22 RX ORDER — FLUOXETINE HYDROCHLORIDE 40 MG/1
CAPSULE ORAL
Qty: 30 CAPSULE | Refills: 2 | Status: SHIPPED | OUTPATIENT
Start: 2019-04-22 | End: 2019-07-03 | Stop reason: SDUPTHER

## 2019-04-22 RX ORDER — TRAZODONE HYDROCHLORIDE 100 MG/1
TABLET ORAL
Qty: 30 TABLET | Refills: 2 | Status: SHIPPED | OUTPATIENT
Start: 2019-04-22 | End: 2019-08-08 | Stop reason: SDUPTHER

## 2019-04-22 RX ORDER — LAMOTRIGINE 100 MG/1
TABLET ORAL
Qty: 30 TABLET | Refills: 1 | Status: CANCELLED | OUTPATIENT
Start: 2019-04-22

## 2019-04-22 RX ORDER — LAMOTRIGINE 100 MG/1
TABLET ORAL
Qty: 60 TABLET | Refills: 2 | Status: SHIPPED | OUTPATIENT
Start: 2019-04-22 | End: 2019-07-03 | Stop reason: SDUPTHER

## 2019-04-22 NOTE — PATIENT INSTRUCTIONS
Cont Lamictal 100mg  Take one tablet every 12 hours  Cont Trazodone 1/2 to 1 tablet at bedtime   Continue Prozac 40 mg one tablet every morning
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
## 2019-07-03 RX ORDER — FLUOXETINE HYDROCHLORIDE 40 MG/1
CAPSULE ORAL
Qty: 30 CAPSULE | Refills: 2 | OUTPATIENT
Start: 2019-07-03

## 2019-07-03 RX ORDER — LAMOTRIGINE 100 MG/1
TABLET ORAL
Qty: 90 TABLET | Refills: 0 | Status: SHIPPED | OUTPATIENT
Start: 2019-07-03 | End: 2019-08-08 | Stop reason: SDUPTHER

## 2019-07-03 RX ORDER — LAMOTRIGINE 100 MG/1
TABLET ORAL
Qty: 60 TABLET | Refills: 2 | OUTPATIENT
Start: 2019-07-03

## 2019-07-03 RX ORDER — FLUOXETINE HYDROCHLORIDE 40 MG/1
CAPSULE ORAL
Qty: 45 CAPSULE | Refills: 0 | Status: SHIPPED | OUTPATIENT
Start: 2019-07-03 | End: 2019-08-08 | Stop reason: SDUPTHER

## 2019-08-08 ENCOUNTER — OFFICE VISIT (OUTPATIENT)
Dept: PSYCHIATRY | Facility: CLINIC | Age: 19
End: 2019-08-08
Payer: COMMERCIAL

## 2019-08-08 VITALS
HEIGHT: 65 IN | RESPIRATION RATE: 17 BRPM | BODY MASS INDEX: 27.99 KG/M2 | DIASTOLIC BLOOD PRESSURE: 73 MMHG | SYSTOLIC BLOOD PRESSURE: 121 MMHG | HEART RATE: 93 BPM | WEIGHT: 168 LBS

## 2019-08-08 DIAGNOSIS — F31.31 BIPOLAR AFFECTIVE DISORDER, CURRENTLY DEPRESSED, MILD: Primary | ICD-10-CM

## 2019-08-08 DIAGNOSIS — F31.61 BIPOLAR DISORDER, CURRENT EPISODE MIXED, MILD: ICD-10-CM

## 2019-08-08 PROCEDURE — 99999 PR PBB SHADOW E&M-EST. PATIENT-LVL III: CPT | Mod: PBBFAC,,, | Performed by: PSYCHOLOGIST

## 2019-08-08 PROCEDURE — 99214 OFFICE O/P EST MOD 30 MIN: CPT | Mod: S$GLB,,, | Performed by: PSYCHOLOGIST

## 2019-08-08 PROCEDURE — 3008F PR BODY MASS INDEX (BMI) DOCUMENTED: ICD-10-PCS | Mod: CPTII,S$GLB,, | Performed by: PSYCHOLOGIST

## 2019-08-08 PROCEDURE — 99214 PR OFFICE/OUTPT VISIT, EST, LEVL IV, 30-39 MIN: ICD-10-PCS | Mod: S$GLB,,, | Performed by: PSYCHOLOGIST

## 2019-08-08 PROCEDURE — 99999 PR PBB SHADOW E&M-EST. PATIENT-LVL III: ICD-10-PCS | Mod: PBBFAC,,, | Performed by: PSYCHOLOGIST

## 2019-08-08 PROCEDURE — 3008F BODY MASS INDEX DOCD: CPT | Mod: CPTII,S$GLB,, | Performed by: PSYCHOLOGIST

## 2019-08-08 RX ORDER — LAMOTRIGINE 100 MG/1
TABLET ORAL
Qty: 90 TABLET | Refills: 1 | Status: SHIPPED | OUTPATIENT
Start: 2019-08-08 | End: 2019-09-20

## 2019-08-08 RX ORDER — TRAZODONE HYDROCHLORIDE 100 MG/1
TABLET ORAL
Qty: 45 TABLET | Refills: 1 | Status: SHIPPED | OUTPATIENT
Start: 2019-08-08 | End: 2019-09-20 | Stop reason: SDUPTHER

## 2019-08-08 RX ORDER — FLUOXETINE HYDROCHLORIDE 40 MG/1
CAPSULE ORAL
Qty: 30 CAPSULE | Refills: 1 | Status: SHIPPED | OUTPATIENT
Start: 2019-08-08 | End: 2019-09-20 | Stop reason: SDUPTHER

## 2019-08-08 NOTE — PROGRESS NOTES
Client: Mary Chandler  : [birth year]  PCP: Nilam Leyva    Reason for visit:  the client was seen to assess her response to medication prescribed, evaluate compliance, continue to  her and to coordinate care if needed.  She reported she has had fewer alon and longer depressive episode.  No SI/HI/delusions or hallucinations. Stress is definitely a trigger for mood changes. PHQ9 score today was 7 [10]  [PHQ9 score today was ]9]. Reviewed her stile of thinking she can do it all then gets into trouble.    She reported she broke up with her boyfriend lives with her parents for about 1 week then had her own place. She      Family psychiatric history:  Father: depressed, Great aunt-schizophrenia, BPD: MGM, PGM    Stressors:  School, concern about losing her scholarshi    Relevant lab reviewed   cholesterol elevated, CBC/CMP nl ,  3/28 TSH nl  No EKG on file-no history of cardiac symptoms    Review of patient's allergies indicates:  No Known Allergies    Current Outpatient Medications:     FLUoxetine 40 MG capsule, Take 1 tablet every morning, Disp: 45 capsule, Rfl: 0    lamoTRIgine (LAMICTAL) 100 MG tablet, Take 1 tablet every 12 hours, Disp: 90 tablet, Rfl: 0    traZODone (DESYREL) 100 MG tablet, Take 1/2 to 1 tablet every night at bedtime, Disp: 30 tablet, Rfl: 2  Past Medical History:   Diagnosis Date    Anxiety     Depression     Pneumonia      Clinical presentation:  Appearance:  The client presented in casual attire evidencing good grooming and hygiene    Neuro:  the client was alert, oriented x 4 and evidenced good judgement and insight.      Attention/concentration:  Was good in session    Memory:  The client had no subjective memory complaints and she was able to recall information discussed in session accurately    Judgement:  behavior is adequate to the situation    Fund of knowledge:  intact and appropriate to age and level of education    Gait/station:  was normal    Heart: the patients  heartbeat was regular in rate and rhythm.      Lung: clear     Skin warm and dry.     Extremities: were warm and dry, no edema    Mood:  was mildly dysthymic, worried about school.  No SI/HI/delusions or hallucinations reported or suspected.     Affect: was normal range    Speech:  normal rate and tone     Sleep: the client had no reported history of sleep problems.  Onset was normal and they reported waking up feeling well rested using 1/4 to 1/2 of the Trazodone.  No daytime sleepiness was reported    Appetite: was reported as good    ETOH/Substance use history:  The client had no reported ETOH/or other substance use problems by their report. Denied caffeine use and sts she never smoked    Psychosocial history:  The client is moving into her own apt associated with her employment.   She has a good relationship with parents and 3 brother. 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  RBA/meded reviewed for prescribed agents today  including teratogenicity.  Discussed sleep habits and use of caffeine again-inc in trazodone irf needed planned.    · Discussed need to be proactive with problems and not to ignore situations until they become destabilizing.  Need for balanced lifestyle reviewed. Talked with her about her tendency to take the job with more money and not the one with a better future.  She is planning to finish her AA degree 5/2020 with major in communication and business--wants to work in [employer].     Impression: Bipolar disorder MRE depressed, improved mood clinically and subjectively.  Mood swings are less intense.  Needs to learn to balance responsibilities and be proactive about stress and activities that increase stress-she is being  setting realistic goals    Plan:  Cont Prozac 40mg one tablet each morning  Cont Trazodone 100mg to 150mg hs for sleep  Cont Lamictal 100 mg -1 1/2 tablets every 12 hours  Return to clinic 4 weeks and as needed, call if problems    Session:  [id number]

## 2019-09-20 ENCOUNTER — LAB VISIT (OUTPATIENT)
Dept: LAB | Facility: HOSPITAL | Age: 19
End: 2019-09-20
Attending: FAMILY MEDICINE
Payer: COMMERCIAL

## 2019-09-20 ENCOUNTER — OFFICE VISIT (OUTPATIENT)
Dept: FAMILY MEDICINE | Facility: CLINIC | Age: 19
End: 2019-09-20
Payer: COMMERCIAL

## 2019-09-20 ENCOUNTER — OFFICE VISIT (OUTPATIENT)
Dept: PSYCHIATRY | Facility: CLINIC | Age: 19
End: 2019-09-20
Payer: COMMERCIAL

## 2019-09-20 VITALS
HEIGHT: 65 IN | OXYGEN SATURATION: 99 % | HEIGHT: 65 IN | DIASTOLIC BLOOD PRESSURE: 79 MMHG | HEART RATE: 89 BPM | TEMPERATURE: 98 F | BODY MASS INDEX: 27.58 KG/M2 | DIASTOLIC BLOOD PRESSURE: 72 MMHG | RESPIRATION RATE: 18 BRPM | SYSTOLIC BLOOD PRESSURE: 137 MMHG | WEIGHT: 165.56 LBS | SYSTOLIC BLOOD PRESSURE: 100 MMHG | BODY MASS INDEX: 27.56 KG/M2 | WEIGHT: 165.38 LBS | HEART RATE: 88 BPM

## 2019-09-20 DIAGNOSIS — Z13.6 ENCOUNTER FOR LIPID SCREENING FOR CARDIOVASCULAR DISEASE: ICD-10-CM

## 2019-09-20 DIAGNOSIS — Z79.899 MEDICATION MANAGEMENT: ICD-10-CM

## 2019-09-20 DIAGNOSIS — Z13.220 ENCOUNTER FOR LIPID SCREENING FOR CARDIOVASCULAR DISEASE: ICD-10-CM

## 2019-09-20 DIAGNOSIS — F31.70 BIPOLAR AFFECTIVE DISORDER IN REMISSION: Primary | ICD-10-CM

## 2019-09-20 DIAGNOSIS — Z78.9 ALCOHOL USE: ICD-10-CM

## 2019-09-20 DIAGNOSIS — Z00.00 WELLNESS EXAMINATION: Primary | ICD-10-CM

## 2019-09-20 DIAGNOSIS — Z00.00 WELLNESS EXAMINATION: ICD-10-CM

## 2019-09-20 DIAGNOSIS — Z11.3 SCREENING EXAMINATION FOR STD (SEXUALLY TRANSMITTED DISEASE): ICD-10-CM

## 2019-09-20 DIAGNOSIS — Z28.20 VACCINE REFUSED BY PATIENT: ICD-10-CM

## 2019-09-20 LAB
ALBUMIN SERPL BCP-MCNC: 4.4 G/DL (ref 3.5–5.2)
ALP SERPL-CCNC: 81 U/L (ref 55–135)
ALT SERPL W/O P-5'-P-CCNC: 19 U/L (ref 10–44)
ANION GAP SERPL CALC-SCNC: 9 MMOL/L (ref 8–16)
AST SERPL-CCNC: 29 U/L (ref 10–40)
BASOPHILS # BLD AUTO: 0.05 K/UL (ref 0–0.2)
BASOPHILS NFR BLD: 1 % (ref 0–1.9)
BILIRUB SERPL-MCNC: 0.3 MG/DL (ref 0.1–1)
BUN SERPL-MCNC: 6 MG/DL (ref 6–20)
CALCIUM SERPL-MCNC: 9 MG/DL (ref 8.7–10.5)
CHLORIDE SERPL-SCNC: 108 MMOL/L (ref 95–110)
CHOLEST SERPL-MCNC: 192 MG/DL (ref 120–199)
CHOLEST/HDLC SERPL: 3.4 {RATIO} (ref 2–5)
CO2 SERPL-SCNC: 27 MMOL/L (ref 23–29)
CREAT SERPL-MCNC: 0.8 MG/DL (ref 0.5–1.4)
DIFFERENTIAL METHOD: ABNORMAL
EOSINOPHIL # BLD AUTO: 0.1 K/UL (ref 0–0.5)
EOSINOPHIL NFR BLD: 2.7 % (ref 0–8)
ERYTHROCYTE [DISTWIDTH] IN BLOOD BY AUTOMATED COUNT: 13.5 % (ref 11.5–14.5)
EST. GFR  (AFRICAN AMERICAN): >60 ML/MIN/1.73 M^2
EST. GFR  (NON AFRICAN AMERICAN): >60 ML/MIN/1.73 M^2
GLUCOSE SERPL-MCNC: 72 MG/DL (ref 70–110)
HCT VFR BLD AUTO: 42.2 % (ref 37–48.5)
HDLC SERPL-MCNC: 56 MG/DL (ref 40–75)
HDLC SERPL: 29.2 % (ref 20–50)
HGB BLD-MCNC: 13.6 G/DL (ref 12–16)
IMM GRANULOCYTES # BLD AUTO: 0.01 K/UL (ref 0–0.04)
IMM GRANULOCYTES NFR BLD AUTO: 0.2 % (ref 0–0.5)
LDLC SERPL CALC-MCNC: 119.6 MG/DL (ref 63–159)
LYMPHOCYTES # BLD AUTO: 1.4 K/UL (ref 1–4.8)
LYMPHOCYTES NFR BLD: 28 % (ref 18–48)
MCH RBC QN AUTO: 27.3 PG (ref 27–31)
MCHC RBC AUTO-ENTMCNC: 32.2 G/DL (ref 32–36)
MCV RBC AUTO: 85 FL (ref 82–98)
MONOCYTES # BLD AUTO: 0.4 K/UL (ref 0.3–1)
MONOCYTES NFR BLD: 8.9 % (ref 4–15)
NEUTROPHILS # BLD AUTO: 2.9 K/UL (ref 1.8–7.7)
NEUTROPHILS NFR BLD: 59.2 % (ref 38–73)
NONHDLC SERPL-MCNC: 136 MG/DL
NRBC BLD-RTO: 0 /100 WBC
PLATELET # BLD AUTO: 347 K/UL (ref 150–350)
PMV BLD AUTO: 9.1 FL (ref 9.2–12.9)
POTASSIUM SERPL-SCNC: 3.9 MMOL/L (ref 3.5–5.1)
PROT SERPL-MCNC: 7.6 G/DL (ref 6–8.4)
RBC # BLD AUTO: 4.98 M/UL (ref 4–5.4)
SODIUM SERPL-SCNC: 144 MMOL/L (ref 136–145)
TRIGL SERPL-MCNC: 82 MG/DL (ref 30–150)
WBC # BLD AUTO: 4.82 K/UL (ref 3.9–12.7)

## 2019-09-20 PROCEDURE — 99999 PR PBB SHADOW E&M-EST. PATIENT-LVL III: CPT | Mod: PBBFAC,,, | Performed by: FAMILY MEDICINE

## 2019-09-20 PROCEDURE — 99999 PR PBB SHADOW E&M-EST. PATIENT-LVL II: ICD-10-PCS | Mod: PBBFAC,,, | Performed by: PSYCHOLOGIST

## 2019-09-20 PROCEDURE — 80061 LIPID PANEL: CPT

## 2019-09-20 PROCEDURE — 99395 PREV VISIT EST AGE 18-39: CPT | Mod: S$GLB,,, | Performed by: FAMILY MEDICINE

## 2019-09-20 PROCEDURE — 85025 COMPLETE CBC W/AUTO DIFF WBC: CPT

## 2019-09-20 PROCEDURE — 99999 PR PBB SHADOW E&M-EST. PATIENT-LVL III: ICD-10-PCS | Mod: PBBFAC,,, | Performed by: FAMILY MEDICINE

## 2019-09-20 PROCEDURE — 99213 OFFICE O/P EST LOW 20 MIN: CPT | Mod: S$GLB,,, | Performed by: PSYCHOLOGIST

## 2019-09-20 PROCEDURE — 3008F PR BODY MASS INDEX (BMI) DOCUMENTED: ICD-10-PCS | Mod: CPTII,S$GLB,, | Performed by: PSYCHOLOGIST

## 2019-09-20 PROCEDURE — 3008F BODY MASS INDEX DOCD: CPT | Mod: CPTII,S$GLB,, | Performed by: PSYCHOLOGIST

## 2019-09-20 PROCEDURE — 80053 COMPREHEN METABOLIC PANEL: CPT

## 2019-09-20 PROCEDURE — 36415 COLL VENOUS BLD VENIPUNCTURE: CPT | Mod: PO

## 2019-09-20 PROCEDURE — 99213 PR OFFICE/OUTPT VISIT, EST, LEVL III, 20-29 MIN: ICD-10-PCS | Mod: S$GLB,,, | Performed by: PSYCHOLOGIST

## 2019-09-20 PROCEDURE — 99999 PR PBB SHADOW E&M-EST. PATIENT-LVL II: CPT | Mod: PBBFAC,,, | Performed by: PSYCHOLOGIST

## 2019-09-20 PROCEDURE — 99395 PR PREVENTIVE VISIT,EST,18-39: ICD-10-PCS | Mod: S$GLB,,, | Performed by: FAMILY MEDICINE

## 2019-09-20 RX ORDER — LAMOTRIGINE 150 MG/1
TABLET ORAL
Qty: 30 TABLET | Refills: 2 | Status: SHIPPED | OUTPATIENT
Start: 2019-09-20 | End: 2019-12-13 | Stop reason: SDUPTHER

## 2019-09-20 RX ORDER — FLUOXETINE HYDROCHLORIDE 40 MG/1
CAPSULE ORAL
Qty: 30 CAPSULE | Refills: 2 | Status: SHIPPED | OUTPATIENT
Start: 2019-09-20 | End: 2019-12-13 | Stop reason: SDUPTHER

## 2019-09-20 RX ORDER — TRAZODONE HYDROCHLORIDE 100 MG/1
TABLET ORAL
Qty: 45 TABLET | Refills: 2 | Status: SHIPPED | OUTPATIENT
Start: 2019-09-20 | End: 2019-12-13 | Stop reason: SDUPTHER

## 2019-09-20 NOTE — PATIENT INSTRUCTIONS
Birth Control Choices  Birth control keeps you from getting pregnant during sex. There are many types of birth control. Some are more effective than others. New types are being tested all the time. Your healthcare provider can help you decide which type of birth control is best for you. But no matter which type you choose, you and your partner must use it the right way each time you have sex. Some of the most common types are described below.  Condom  A condom is a thin covering that fits over the penis. (The female condom fits inside the vagina.) A condom catches sperm that come out of the penis during sex.  Spermicide  Spermicide is a gel, foam, cream, tablet, or sponge (although the sponge has barrier properties in addition to spermicidal properties). It is put in the vagina before sex to kill sperm.  Diaphragm and cervical cap  Diaphragms and cervical caps are round rubber cups that keep sperm out of the uterus. They also hold spermicide in place.  Intrauterine device (IUD)  An IUD is a small device that is placed in the uterus by a healthcare provider to prevent pregnancy.  The pill  The birth control pill is taken daily. It contains hormones that stop a womans body from releasing an egg each month.  Other hormones  Hormones that stop a womans egg from being released each month can be delivered in other ways. These include injection, implant, patch, or vaginal ring.  Other choices  Here are additional birth control methods:  · Male sterilization (vasectomy) is surgery that ties off or cuts the tubes called the vas deferens in the testes. This is done so sperm cannot come out when the man ejaculates.  · Female sterilization is surgery to block or cut the woman's fallopian tubes. It can be done by placing an instrument into the uterus (hysteroscopy) to insert small coils into the fallopian tubes (Essure). It can also be done through the belly (laparoscopy) to block the tubes or remove part or all of the  tubes.  · Withdrawal method is when the male doesn't ejaculate into the vagina, but rather withdraws his penis just before he ejaculates.  · Fertility awareness method is when a woman keeps track of her fertile days. She only has intercourse at times when she is not likely to get pregnant.  Emergency contraception (EC)  Emergency contraception can help prevent pregnancy after unprotected sex. Hormone pills (morning after pills) are available over the counter to anyone. A second type of EC, a copper IUD, needs to be inserted by a trained healthcare provider. Either type of EC can be used up to 5 days after sex, but it should be taken as soon as possible. The sooner it is used after unprotected sex, the more likely it is to be effective. EC will not work if youre already pregnant.  Things to consider  Think about the following:  · Choose a type of birth control that is easy for you to use.  · Read the package and follow your health care provider's instructions to learn to use your birth control the right way.  · Most forms of birth control do not protect you from sexually transmitted infections (STIs). To protect against STIs, always use a latex condom. If you are allergic to latex, a nonlatex condom may provide some protection.   Date Last Reviewed: 12/1/2016  © 1023-1320 [employer]. [address] [place] [address], [place] [address]. All rights reserved. This information is not intended as a substitute for professional medical care. Always follow your healthcare professional's instructions.

## 2019-09-20 NOTE — PROGRESS NOTES
Client: Mary Chandler  : [birth year]  PCP: Nilam Leyva    Reason for visit:  the client was seen to assess her response to medication prescribed, evaluate compliance, continue to  her and to coordinate care if needed.  She reported she has had fewer mood swings and that they have not been severe. She did not want to increase the mood stabilizer. She is happy with her current regime and had no ADRs/SE to report.  Sleep and and appetite reported as being good.   She is also exercising regularly. She reported her father was enraged she took a semester off from school (she pays for it) and that they have not been talking as much. She is also folloowing up with birth control-sts she is deciding what is best for her and thinks she will get an IUD.  PHQ9 score today was 3 [7]  GAD7 score today was 10 [9].  She had no SI/HI/delusions or hallucinations, no significant mood swings.  No SI/HI/delusions or hallucinations. Stress is definitely a trigger for mood changes.     Family psychiatric history:  Father: depressed, Great aunt-schizophrenia, BPD: MGM, PGM    Stressors:  School, concern about losing her scholarshi    Relevant lab reviewed   cholesterol elevated, CBC/CMP nl ,  3/28 TSH nl  No EKG on file-no history of cardiac symptoms Has orders for labs today    Review of patient's allergies indicates:  No Known Allergies    Current Outpatient Medications:     FLUoxetine 40 MG capsule, Take 1 tablet every morning, Disp: 30 capsule, Rfl: 1    lamoTRIgine (LAMICTAL) 100 MG tablet, Take 1 tablet every 12 hours, Disp: 90 tablet, Rfl: 1    traZODone (DESYREL) 100 MG tablet, Take 1 to 1 1/2 tablets every night at bedtime, Disp: 45 tablet, Rfl: 1  Past Medical History:   Diagnosis Date    Anxiety     Depression     Pneumonia      Clinical presentation:  Appearance:  The client presented in casual attire evidencing good grooming and hygiene    Neuro:  the client was alert, oriented x 4 and evidenced good  judgement and insight.      Attention/concentration:  Was good in session    Memory:  The client had no subjective memory complaints and she was able to recall information discussed in session accurately    Judgement:  behavior is adequate to the situation    Fund of knowledge:  intact and appropriate to age and level of education    Gait/station:  was normal    Heart: the patients heartbeat was regular in rate and rhythm.  No murmur rub or gallops    Lung: clear     Skin warm and dry.     Extremities: were warm and dry, no edema    Mood:  was mildly dysthymic, worried about school.  No SI/HI/delusions or hallucinations reported or suspected.     Affect: was normal range    Speech:  normal rate and tone     Sleep: the client had no reported history of sleep problems.  Onset was normal, sts she is well rested.  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 She evidenced good understanding of information shared in the session and had no questions.  · Reviewed the need for balance in her life, monitoring her sleep and mood and seeking help if symptoms escalate, has increased mood changes etc.       Impression: Bipolar disorder MRE depressed, improved mood clinically and subjectively.  No med changes planned    Plan:  Cont Prozac 40mg one tablet each morning  Cont Trazodone 100mg to 150mg hs for sleep  Cont Lamictal 100 mg -1 1/2  tablets every 12 hours  Return to clinic 3 months, call if problems    Session:  [id number]

## 2019-09-20 NOTE — PROGRESS NOTES
Subjective:       Patient ID: Mary Jones is a 19 y.o. female.    Chief Complaint: Annual Exam    HPI    Ms. Jones presents to clinic for her annual exam.     Exercise: 3-4 times a week she jogging    Diet: Not a healthy diet.     Currently seeing Dr. Pina who is managing her bipolar disorder, anxiety, and depression.     Past Medical History:   Diagnosis Date    Anxiety     Depression     Pneumonia 9/12       History reviewed. No pertinent surgical history.    Family History   Problem Relation Age of Onset    Rheum arthritis Mother     Cancer Maternal Grandmother     Heart disease Maternal Grandmother     Hypertension Maternal Grandmother     Cancer Maternal Grandfather     Heart disease Maternal Grandfather     Diabetes Maternal Grandfather     Celiac disease Paternal Grandmother        Social History     Tobacco Use    Smoking status: Never Smoker   Substance Use Topics    Alcohol use: Yes     Frequency: 2-3 times a week     Drinks per session: 3 or 4    Drug use: Yes     Frequency: 7.0 times per week     Types: Marijuana       Social History     Substance and Sexual Activity   Sexual Activity Yes    Birth control/protection: Condom          Current Outpatient Medications:     FLUoxetine 40 MG capsule, Take 1 tablet every morning, Disp: 30 capsule, Rfl: 2    lamoTRIgine (LAMICTAL) 150 MG Tab, Take 1 tablet every 12 hours, Disp: 30 tablet, Rfl: 2    traZODone (DESYREL) 100 MG tablet, Take 1 to 1 1/2 tablets every night at bedtime, Disp: 45 tablet, Rfl: 2     Review of patient's allergies indicates:  No Known Allergies     Single    Review of Systems   Constitutional: Negative for chills and fever.   HENT: Negative for congestion and sore throat.    Eyes: Negative for visual disturbance.   Respiratory: Negative for cough and shortness of breath.    Cardiovascular: Negative for chest pain.   Gastrointestinal: Negative for abdominal pain, constipation, diarrhea, nausea and vomiting.  "  Genitourinary: Negative for dysuria.   Musculoskeletal: Negative for joint swelling.   Skin: Negative for rash and wound.   Neurological: Negative for dizziness and headaches.   Hematological: Does not bruise/bleed easily.           Objective:          Vitals:    09/20/19 0818   BP: 100/72   Pulse: 88   Temp: 98.4 °F (36.9 °C)   SpO2: 99%   Weight: 75 kg (165 lb 5.5 oz)   Height: 5' 5" (1.651 m)       Physical Exam   Constitutional: She appears well-developed and well-nourished.   HENT:   Head: Normocephalic and atraumatic.   Eyes: Conjunctivae are normal.   Neck: Normal range of motion. Neck supple.   Cardiovascular: Normal rate, regular rhythm and normal heart sounds.   Pulmonary/Chest: Effort normal and breath sounds normal.   Abdominal: Soft. Bowel sounds are normal.   Neurological: She is alert.   Skin: Skin is warm.   Psychiatric: She has a normal mood and affect. Her behavior is normal.   Vitals reviewed.              Assessment/Plan     Mary was seen today for annual exam.    Diagnoses and all orders for this visit:    Wellness examination  -     Comprehensive metabolic panel; Future  -     CBC auto differential; Future  -     URINALYSIS; Future    Encounter for lipid screening for cardiovascular disease  -     Lipid panel; Future    Alcohol use        - Told to try to cut back on alcohol use    Vaccine refused by patient        - Declined flu and tetanus    Screening examination for STD (sexually transmitted disease)        - Declined HIV and gc/chlamydia testing    Patient would like to be on some form of birth control that doesn't effect her lamictal level. Given handout on options of birth control and those with estrogen in them.     Follow up for Pap smear and birth control decision.    Future Appointments   Date Time Provider Department Center   9/20/2019 11:00 AM [facility] WELLS [facility] [place]   12/13/2019  9:00 AM Lanette Pina [facility] [place]   Flaca Laurent MD  [facility] "

## 2019-10-22 ENCOUNTER — OFFICE VISIT (OUTPATIENT)
Dept: FAMILY MEDICINE | Facility: CLINIC | Age: 19
End: 2019-10-22
Payer: COMMERCIAL

## 2019-10-22 VITALS
DIASTOLIC BLOOD PRESSURE: 74 MMHG | HEART RATE: 104 BPM | HEIGHT: 65 IN | OXYGEN SATURATION: 98 % | SYSTOLIC BLOOD PRESSURE: 100 MMHG | BODY MASS INDEX: 27.99 KG/M2 | WEIGHT: 168 LBS | TEMPERATURE: 99 F

## 2019-10-22 DIAGNOSIS — J02.9 PHARYNGITIS, UNSPECIFIED ETIOLOGY: ICD-10-CM

## 2019-10-22 DIAGNOSIS — J03.90 TONSILLITIS: Primary | ICD-10-CM

## 2019-10-22 PROCEDURE — 3008F PR BODY MASS INDEX (BMI) DOCUMENTED: ICD-10-PCS | Mod: CPTII,S$GLB,ICN, | Performed by: NURSE PRACTITIONER

## 2019-10-22 PROCEDURE — 87081 CULTURE SCREEN ONLY: CPT

## 2019-10-22 PROCEDURE — 99213 PR OFFICE/OUTPT VISIT, EST, LEVL III, 20-29 MIN: ICD-10-PCS | Mod: 25,S$GLB,ICN, | Performed by: NURSE PRACTITIONER

## 2019-10-22 PROCEDURE — 99213 OFFICE O/P EST LOW 20 MIN: CPT | Mod: 25,S$GLB,ICN, | Performed by: NURSE PRACTITIONER

## 2019-10-22 PROCEDURE — 3008F BODY MASS INDEX DOCD: CPT | Mod: CPTII,S$GLB,ICN, | Performed by: NURSE PRACTITIONER

## 2019-10-22 PROCEDURE — 87147 CULTURE TYPE IMMUNOLOGIC: CPT | Mod: 59

## 2019-10-22 PROCEDURE — 87880 STREP A ASSAY W/OPTIC: CPT | Mod: QW,S$GLB,, | Performed by: NURSE PRACTITIONER

## 2019-10-22 PROCEDURE — 99999 PR PBB SHADOW E&M-EST. PATIENT-LVL III: CPT | Mod: PBBFAC,,, | Performed by: NURSE PRACTITIONER

## 2019-10-22 PROCEDURE — 99999 PR PBB SHADOW E&M-EST. PATIENT-LVL III: ICD-10-PCS | Mod: PBBFAC,,, | Performed by: NURSE PRACTITIONER

## 2019-10-22 PROCEDURE — 87880 POCT RAPID STREP A: ICD-10-PCS | Mod: QW,S$GLB,, | Performed by: NURSE PRACTITIONER

## 2019-10-22 RX ORDER — AMOXICILLIN AND CLAVULANATE POTASSIUM 875; 125 MG/1; MG/1
1 TABLET, FILM COATED ORAL 2 TIMES DAILY
Qty: 20 TABLET | Refills: 0 | Status: SHIPPED | OUTPATIENT
Start: 2019-10-22 | End: 2019-11-01

## 2019-10-22 NOTE — PROGRESS NOTES
Subjective:       Patient ID: Mary Jones is a 19 y.o. female.    Chief Complaint: Sore Throat    Sore Throat    This is a new problem. The current episode started in the past 7 days. The problem has been gradually worsening. The pain is worse on the left side. The maximum temperature recorded prior to her arrival was 100.4 - 100.9 F. The pain is at a severity of 10/10. The pain is severe. Associated symptoms include ear pain (left ear), a plugged ear sensation, swollen glands and trouble swallowing (pain ONLY). Pertinent negatives include no congestion, drooling, shortness of breath, stridor or vomiting. She has had no exposure to strep or mono.     Patient Active Problem List   Diagnosis    Anxiety and depression    Bipolar disorder current episode depressed       Current Outpatient Medications:     FLUoxetine 40 MG capsule, Take 1 tablet every morning, Disp: 30 capsule, Rfl: 2    lamoTRIgine (LAMICTAL) 150 MG Tab, Take 1 tablet every 12 hours, Disp: 30 tablet, Rfl: 2    traZODone (DESYREL) 100 MG tablet, Take 1 to 1 1/2 tablets every night at bedtime, Disp: 45 tablet, Rfl: 2    amoxicillin-clavulanate 875-125mg (AUGMENTIN) 875-125 mg per tablet, Take 1 tablet by mouth 2 (two) times daily. for 10 days, Disp: 20 tablet, Rfl: 0    Lab Results   Component Value Date    WBC 4.82 09/20/2019    HGB 13.6 09/20/2019    HCT 42.2 09/20/2019     09/20/2019    CHOL 192 09/20/2019    TRIG 82 09/20/2019    HDL 56 09/20/2019    ALT 19 09/20/2019    AST 29 09/20/2019     09/20/2019    K 3.9 09/20/2019     09/20/2019    CREATININE 0.8 09/20/2019    BUN 6 09/20/2019    CO2 27 09/20/2019    TSH 1.594 03/28/2019     Review of Systems   Constitutional: Positive for fever (100, worse at night).   HENT: Positive for ear pain (left ear), sore throat and trouble swallowing (pain ONLY). Negative for congestion, drooling and facial swelling.    Respiratory: Negative for shortness of breath and stridor.     Gastrointestinal: Negative for vomiting.   Neurological: Positive for light-headedness. Negative for dizziness and weakness.       Objective:      Physical Exam   Constitutional: She is oriented to person, place, and time. Vital signs are normal. She appears well-developed and well-nourished. No distress.   HENT:   Right Ear: Tympanic membrane, external ear and ear canal normal.   Left Ear: Tympanic membrane, external ear and ear canal normal.   Nose: Right sinus exhibits no maxillary sinus tenderness and no frontal sinus tenderness. Left sinus exhibits no maxillary sinus tenderness and no frontal sinus tenderness.   Mouth/Throat: Mucous membranes are normal. Oropharyngeal exudate, posterior oropharyngeal edema and posterior oropharyngeal erythema present. No tonsillar abscesses.   enlarged left tonsil    Neck: Neck supple. Edema present.   Cardiovascular: Normal rate, regular rhythm and normal heart sounds.   Pulmonary/Chest: Effort normal and breath sounds normal. She has no wheezes.   Abdominal: Soft. Normal appearance.   Musculoskeletal: She exhibits no edema.   Lymphadenopathy:     She has cervical adenopathy.        Left cervical: Superficial cervical, deep cervical and posterior cervical adenopathy present.   Neurological: She is alert and oriented to person, place, and time.   Skin: Skin is warm and dry.   Psychiatric: She has a normal mood and affect.   Nursing note and vitals reviewed.      Assessment:       1. Tonsillitis    2. Pharyngitis, unspecified etiology        Plan:       Mary was seen today for sore throat.    Diagnoses and all orders for this visit:    Tonsillitis  -     amoxicillin-clavulanate 875-125mg (AUGMENTIN) 875-125 mg per tablet; Take 1 tablet by mouth 2 (two) times daily. for 10 days  Take antibiotics with food, increase fluids. RTC if symptoms fail to improve, become worse or return after completion of antibiotics     Pharyngitis, unspecified etiology  -     POCT Rapid Strep A  -      Strep A culture, throat  POCT negative, await culture

## 2019-10-24 LAB
BACTERIA THROAT CULT: ABNORMAL
BACTERIA THROAT CULT: ABNORMAL

## 2019-11-25 ENCOUNTER — CLINICAL SUPPORT (OUTPATIENT)
Dept: URGENT CARE | Facility: CLINIC | Age: 19
End: 2019-11-25
Payer: COMMERCIAL

## 2019-11-25 VITALS
SYSTOLIC BLOOD PRESSURE: 129 MMHG | TEMPERATURE: 99 F | HEART RATE: 109 BPM | BODY MASS INDEX: 27.86 KG/M2 | OXYGEN SATURATION: 98 % | WEIGHT: 167.38 LBS | DIASTOLIC BLOOD PRESSURE: 90 MMHG

## 2019-11-25 DIAGNOSIS — J02.9 SORE THROAT: ICD-10-CM

## 2019-11-25 DIAGNOSIS — B27.90 INFECTIOUS MONONUCLEOSIS WITHOUT COMPLICATION, INFECTIOUS MONONUCLEOSIS DUE TO UNSPECIFIED ORGANISM: Primary | ICD-10-CM

## 2019-11-25 LAB
CTP QC/QA: YES
CTP QC/QA: YES
HETEROPH AB SER QL: POSITIVE
S PYO RRNA THROAT QL PROBE: NEGATIVE

## 2019-11-25 PROCEDURE — 99204 OFFICE O/P NEW MOD 45 MIN: CPT | Mod: 25,S$GLB,, | Performed by: NURSE PRACTITIONER

## 2019-11-25 PROCEDURE — 86308 HETEROPHILE ANTIBODY SCREEN: CPT | Mod: QW,,, | Performed by: NURSE PRACTITIONER

## 2019-11-25 PROCEDURE — 96372 PR INJECTION,THERAP/PROPH/DIAG2ST, IM OR SUBCUT: ICD-10-PCS | Mod: S$GLB,,, | Performed by: NURSE PRACTITIONER

## 2019-11-25 PROCEDURE — 96372 THER/PROPH/DIAG INJ SC/IM: CPT | Mod: S$GLB,,, | Performed by: NURSE PRACTITIONER

## 2019-11-25 PROCEDURE — 99204 PR OFFICE/OUTPT VISIT, NEW, LEVL IV, 45-59 MIN: ICD-10-PCS | Mod: 25,S$GLB,, | Performed by: NURSE PRACTITIONER

## 2019-11-25 PROCEDURE — 86308 POCT INFECTIOUS MONONUCLEOSIS: ICD-10-PCS | Mod: QW,,, | Performed by: NURSE PRACTITIONER

## 2019-11-25 RX ORDER — DEXAMETHASONE SODIUM PHOSPHATE 4 MG/ML
4 INJECTION, SOLUTION INTRA-ARTICULAR; INTRALESIONAL; INTRAMUSCULAR; INTRAVENOUS; SOFT TISSUE ONCE
Status: COMPLETED | OUTPATIENT
Start: 2019-11-25 | End: 2019-11-25

## 2019-11-25 RX ORDER — VALACYCLOVIR HYDROCHLORIDE 500 MG/1
500 TABLET, FILM COATED ORAL 2 TIMES DAILY
Qty: 60 TABLET | Refills: 11 | Status: SHIPPED | OUTPATIENT
Start: 2019-11-25 | End: 2023-07-05

## 2019-11-25 RX ORDER — DEXAMETHASONE SODIUM PHOSPHATE 4 MG/ML
4 INJECTION, SOLUTION INTRA-ARTICULAR; INTRALESIONAL; INTRAMUSCULAR; INTRAVENOUS; SOFT TISSUE
Status: DISCONTINUED | OUTPATIENT
Start: 2019-11-25 | End: 2019-11-25

## 2019-11-25 RX ADMIN — DEXAMETHASONE SODIUM PHOSPHATE 4 MG: 4 INJECTION, SOLUTION INTRA-ARTICULAR; INTRALESIONAL; INTRAMUSCULAR; INTRAVENOUS; SOFT TISSUE at 04:11

## 2019-11-25 NOTE — LETTER
November 25, 2019      [facility]  [address]  [place] [address]  Phone: [phone]       Patient: Mary Jones   YOB: 2000  Date of Visit: 11/25/2019    To Whom It May Concern:    Kaylin Jones  was at [facility] on 11/25/2019. She may return to work/school on 12/02/2019 or without fever for 48 hours with restrictions. Patient should wear mask for aprox 2 weeks while working as to prevent spread of viral infection. If you have any questions or concerns, or if I can be of further assistance, please do not hesitate to contact me.    Sincerely,      Umer Fontaine, NP

## 2019-11-25 NOTE — PROGRESS NOTES
Subjective:       Patient ID: Mary Jones is a 19 y.o. female.    Vitals:  weight is 75.9 kg (167 lb 6.4 oz). Her temperature is 98.5 °F (36.9 °C). Her blood pressure is 129/90 (abnormal) and her pulse is 109. Her oxygen saturation is 98%.     Chief Complaint: Rash    EXPOSED TO HANDS FEET AND MOUTH ( WORKS AT A )   Pt also had tonsillitis 1 month ago and then took pcn and then broke out with a rash ( all over body ).     Rash   This is a new problem. The current episode started in the past 7 days (X 3 DAYS ). The problem is unchanged. Location: [facility]  The rash is characterized by blistering, redness, swelling and pain (slight itchiness and tingling sensation with clear exudate ). Associated with: hands, foot and mouth  Associated symptoms include coughing, fatigue, a fever and a sore throat. Treatments tried: tylenol and clamine lotion  The treatment provided no relief.       Constitution: Positive for chills, sweating, fatigue, fever and generalized weakness.   HENT: Positive for sinus pain, sinus pressure and sore throat.    Respiratory: Positive for cough. Negative for sputum production.    Skin: Positive for rash. Negative for erythema.       Objective:      Physical Exam   Constitutional: She is oriented to person, place, and time. She appears well-developed and well-nourished.   HENT:   Head: Normocephalic and atraumatic.   Eyes: Pupils are equal, round, and reactive to light.   Neck: Normal range of motion.   Cardiovascular: Normal rate.   Abdominal: Soft.   Musculoskeletal: Normal range of motion.   Lymphadenopathy:     She has cervical adenopathy.   Neurological: She is alert and oriented to person, place, and time.   Skin: Skin is warm, not pale and rash. Capillary refill takes less than 2 seconds. erythema  Nursing note and vitals reviewed.        Assessment:       1. Infectious mononucleosis without complication, infectious mononucleosis due to unspecified organism    2. Sore  throat        Plan:       differential diagnosis  URI  Influenza A  Influenza B  Sinusitis  Bronchitis  COPD  Asthma exacerbation   Strep throat.   Mononucleosis.     Monospot +  Decadron 4 mg IM in clinic for rash.   Antiviral per upToDate guidelines/literature.     After discussing evaluation results, patient agrees with proposed plan of care, has no further concerns, and agrees to follow-up with PCP is symptoms do not improve and/or worsen in any way.     Infectious mononucleosis without complication, infectious mononucleosis due to unspecified organism    Sore throat  -     POCT Infectious mononucleosis antibody    Other orders  -     valACYclovir (VALTREX) 500 MG tablet; Take 1 tablet (500 mg total) by mouth 2 (two) times daily.  Dispense: 60 tablet; Refill: 11  -     dexamethasone injection 4 mg

## 2019-11-25 NOTE — PATIENT INSTRUCTIONS
Mononucleosis (Mono)  Mononucleosis, also known as mono, is caused by the Santiago-Barr virus. Mono is best known for causing swollen glands and tiredness, but it can cause other symptoms as well. Most children with mono recover without any problems. But the illness can take a long time to go away. In some cases, mono can cause problems with the liver, spleen, or heart. So it is important to diagnose mono and to watch your child carefully.  How mono is spread  Mono can be easily transmitted from an infected person's saliva by:  · Drinking and eating after them  · Sharing a straw, cup, toothbrushes, and eating utensils  · Kissing and close contact  · Handling toys with children drool  Symptoms of mono     The best treatment for mono is plenty of rest.   In children, common symptoms include:  · Tiredness, weakness  · Fever  · Sore throat  · Tender or swollen lymph nodes in the neck or armpits  · Swollen tonsils  · Rash  · Sore muscles or stiffness  · Headache  · Loss of appetite, nausea  · Dull pain in the stomach area  · Enlarged liver and spleen  · Headaches  · Puffy eyes  · Sensitivity to light  Treating mono  Because it is a viral infection, antibiotics wont cure mono. Your child's healthcare provider may prescribe medicines to help ease your child's pain or discomfort. The best treatment for mono is rest. A child with mono should also drink lots of fluids. To help your child feel better and recover sooner:  · Make sure your child gets enough rest.  · Provide plenty of fluids, such as water or apple juice.  · The spleen may become enlarged with mono. Your child may need to avoid contact sports, and heavy lifting for a while in order to prevent injury to the spleen. Discuss this with your child's healthcare provider.  · Treat fever, sore throat, headache, or aching muscles with acetaminophen or ibuprofen. Never give your child aspirin. Your child's healthcare provider or nurse can help you with the correct  dose.  Symptoms usually last for a few weeks, but can sometimes last for 1 to 2 months or longer. Even after symptoms go away, your child may be tired or weak for some time.  Preventing the spread of mono  While youre caring for a child with mono:  · Wash your hands with warm water and soap often, especially before and after tending to your sick child.  · Monitor your own health and that of other family members.  · Limit a sick childs contact with other children.  · Clean dishes and eating utensils used by a sick child separately in very hot, soapy water. Or run them through the .  When to seek medical care  Call your childs healthcare provider right away if your otherwise healthy child:  · Is younger than 3 months and has a fever of 100.4°F (38°C) or higher  · Is younger than 2 years old and has a fever that lasts more than 24 hours  · Is 2 years old or older and the fever continues for 3 days  · Has repeated fevers above 104°F (40°C) at any age  · Has had a seizure caused by the fever  · Experiences difficult or rapid breathing  · Cant be soothed or shows signs of irritability or restlessness  · Seems unusually drowsy, listless, or unresponsive  · Has trouble eating, drinking, or swallowing  · Stops breathing, even for an instant  · Shows signs of severe chest, neck, or belly pain  Date Last Reviewed: 12/1/2016  © 2959-8505 [employer]. [address] [place] [address], [place] [address]. All rights reserved. This information is not intended as a substitute for professional medical care. Always follow your healthcare professional's instructions.        Place rash patient instructions here.

## 2019-12-13 ENCOUNTER — OFFICE VISIT (OUTPATIENT)
Dept: PSYCHIATRY | Facility: CLINIC | Age: 19
End: 2019-12-13
Payer: COMMERCIAL

## 2019-12-13 VITALS
HEIGHT: 65 IN | BODY MASS INDEX: 27.92 KG/M2 | HEART RATE: 98 BPM | DIASTOLIC BLOOD PRESSURE: 83 MMHG | SYSTOLIC BLOOD PRESSURE: 131 MMHG | WEIGHT: 167.56 LBS

## 2019-12-13 DIAGNOSIS — F31.9 BIPOLAR 1 DISORDER: Primary | ICD-10-CM

## 2019-12-13 PROCEDURE — 3008F PR BODY MASS INDEX (BMI) DOCUMENTED: ICD-10-PCS | Mod: CPTII,S$GLB,, | Performed by: PSYCHOLOGIST

## 2019-12-13 PROCEDURE — 99213 PR OFFICE/OUTPT VISIT, EST, LEVL III, 20-29 MIN: ICD-10-PCS | Mod: S$GLB,,, | Performed by: PSYCHOLOGIST

## 2019-12-13 PROCEDURE — 3008F BODY MASS INDEX DOCD: CPT | Mod: CPTII,S$GLB,, | Performed by: PSYCHOLOGIST

## 2019-12-13 PROCEDURE — 99999 PR PBB SHADOW E&M-EST. PATIENT-LVL III: ICD-10-PCS | Mod: PBBFAC,,, | Performed by: PSYCHOLOGIST

## 2019-12-13 PROCEDURE — 99999 PR PBB SHADOW E&M-EST. PATIENT-LVL III: CPT | Mod: PBBFAC,,, | Performed by: PSYCHOLOGIST

## 2019-12-13 PROCEDURE — 99213 OFFICE O/P EST LOW 20 MIN: CPT | Mod: S$GLB,,, | Performed by: PSYCHOLOGIST

## 2019-12-13 RX ORDER — TRAZODONE HYDROCHLORIDE 100 MG/1
TABLET ORAL
Qty: 45 TABLET | Refills: 3 | Status: SHIPPED | OUTPATIENT
Start: 2019-12-13 | End: 2020-03-13 | Stop reason: SDUPTHER

## 2019-12-13 RX ORDER — LAMOTRIGINE 150 MG/1
TABLET ORAL
Qty: 30 TABLET | Refills: 3 | Status: SHIPPED | OUTPATIENT
Start: 2019-12-13 | End: 2020-03-13

## 2019-12-13 RX ORDER — FLUOXETINE HYDROCHLORIDE 40 MG/1
CAPSULE ORAL
Qty: 30 CAPSULE | Refills: 3 | Status: SHIPPED | OUTPATIENT
Start: 2019-12-13 | End: 2020-03-13 | Stop reason: SDUPTHER

## 2019-12-13 NOTE — PROGRESS NOTES
Client: Mary Chandler  : [birth year]  PCP: Nilam Leyva    Reason for visit:  the client was seen to assess her response to medication prescribed, evaluate compliance, continue to  her and to coordinate care if needed.  Mary is doing well-she had no mood or anxiety and she has not had any mood swings.  No ADRs/SE reported. Sleep and appetite reported as good.  She is returning to school  but is taking 18 hours. Discussed her load and liklihood her stress will increase.  She has an appt in  for an IUD She has reduced her drinking as well. She did forget her pills when she went out of town and had some inc mood variation-now keeps pills with her.  Reviewed the need for preventing problems and need for structure and routine.  On the PHQ9 score today was 7 [3]  GAD7 score today was 5 [10].  She had no SI/HI/delusions or hallucinations, no significant mood swings.  No SI/HI/delusions or hallucinations. Stress is definitely a trigger for mood changes.     Family psychiatric history:  Father: depressed, Great aunt-schizophrenia, BPD: MGM, PGM  Stressors:  School, relationship with her father    Relevant lab reviewed   cholesterol elevated, CBC/CMP nl ,  3/28 TSH nl   No EKG on file-no history of cardiac symptoms Has orders for labs today    Review of patient's allergies indicates:   Allergen Reactions    Pcn [penicillins]      Rash          Current Outpatient Medications:     FLUoxetine 40 MG capsule, Take 1 tablet every morning, Disp: 30 capsule, Rfl: 2    lamoTRIgine (LAMICTAL) 150 MG Tab, Take 1 tablet every 12 hours, Disp: 30 tablet, Rfl: 2    traZODone (DESYREL) 100 MG tablet, Take 1 to 1 1/2 tablets every night at bedtime, Disp: 45 tablet, Rfl: 2    valACYclovir (VALTREX) 500 MG tablet, Take 1 tablet (500 mg total) by mouth 2 (two) times daily., Disp: 60 tablet, Rfl: 11  Past Medical History:   Diagnosis Date    Anxiety     Depression     Pneumonia      Clinical  presentation:  Appearance:  The client presented in casual attire evidencing good grooming and hygiene    Neuro:  the client was alert, oriented x 4 and evidenced good judgement and insight.      Attention/concentration:  Was good in session    Memory:  The client had no subjective memory complaints and she was able to recall information discussed in session accurately    Judgement:  behavior is adequate to the situation    Fund of knowledge:  intact and appropriate to age and level of education    Gait/station:  was normal    Heart: the patients heartbeat was regular in rate and rhythm.      Lung: normal effort    Skin warm and dry.     Extremities: were warm and dry, no edema    Mood:  was mildly dysthymic, worried about school.  No SI/HI/delusions or hallucinations reported or suspected.     Affect: was normal range    Speech:  normal rate and tone     Sleep: the client had no reported history of sleep problems.  Onset was normal, sts she is well rested.  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 She evidenced good understanding of information shared in the session and had no questions.  · Reviewed the need for balance in her life, monitoring her sleep and mood and seeking help if symptoms escalate, has increased mood changes etc.       Impression: Bipolar disorder MRE depressed, improved mood clinically and  subjectively.  No med changes planned    Plan:  Cont Prozac 40mg one tablet each morning  Cont Trazodone 100mg to 150mg hs for sleep  Cont Lamictal 100 mg -1 1/2 tablets every 12 hours  Return to clinic 3 months, call if problems    Session:  [id number]

## 2020-03-13 ENCOUNTER — OFFICE VISIT (OUTPATIENT)
Dept: PSYCHIATRY | Facility: CLINIC | Age: 20
End: 2020-03-13
Payer: COMMERCIAL

## 2020-03-13 VITALS
DIASTOLIC BLOOD PRESSURE: 82 MMHG | WEIGHT: 179.25 LBS | HEART RATE: 96 BPM | BODY MASS INDEX: 29.87 KG/M2 | HEIGHT: 65 IN | TEMPERATURE: 97 F | SYSTOLIC BLOOD PRESSURE: 122 MMHG

## 2020-03-13 DIAGNOSIS — F31.31 BIPOLAR AFFECTIVE DISORDER, CURRENTLY DEPRESSED, MILD: Primary | ICD-10-CM

## 2020-03-13 PROCEDURE — 99212 PR OFFICE/OUTPT VISIT, EST, LEVL II, 10-19 MIN: ICD-10-PCS | Mod: S$GLB,,, | Performed by: PSYCHOLOGIST

## 2020-03-13 PROCEDURE — 99212 OFFICE O/P EST SF 10 MIN: CPT | Mod: S$GLB,,, | Performed by: PSYCHOLOGIST

## 2020-03-13 PROCEDURE — 3008F BODY MASS INDEX DOCD: CPT | Mod: CPTII,S$GLB,, | Performed by: PSYCHOLOGIST

## 2020-03-13 PROCEDURE — 99999 PR PBB SHADOW E&M-EST. PATIENT-LVL III: CPT | Mod: PBBFAC,,, | Performed by: PSYCHOLOGIST

## 2020-03-13 PROCEDURE — 3008F PR BODY MASS INDEX (BMI) DOCUMENTED: ICD-10-PCS | Mod: CPTII,S$GLB,, | Performed by: PSYCHOLOGIST

## 2020-03-13 PROCEDURE — 99999 PR PBB SHADOW E&M-EST. PATIENT-LVL III: ICD-10-PCS | Mod: PBBFAC,,, | Performed by: PSYCHOLOGIST

## 2020-03-13 RX ORDER — TRAZODONE HYDROCHLORIDE 100 MG/1
TABLET ORAL
Qty: 45 TABLET | Refills: 3 | Status: SHIPPED | OUTPATIENT
Start: 2020-03-13 | End: 2023-07-05

## 2020-03-13 RX ORDER — FLUOXETINE HYDROCHLORIDE 40 MG/1
CAPSULE ORAL
Qty: 30 CAPSULE | Refills: 3 | Status: SHIPPED | OUTPATIENT
Start: 2020-03-13 | End: 2023-07-05

## 2020-03-13 RX ORDER — LAMOTRIGINE 200 MG/1
TABLET ORAL
Qty: 60 TABLET | Refills: 3 | Status: SHIPPED | OUTPATIENT
Start: 2020-03-13 | End: 2023-07-05

## 2020-03-13 NOTE — PROGRESS NOTES
Client: Mary Jones  : [birth year]  PCP: Nilam Leyva    Reason for visit:  the client was seen to assess her response to medication prescribed, evaluate compliance, continue to  her and to coordinate care if needed.  Mary is doing well on her current medications.  She has lost her 2 weeks ago.  Sts she is getting a  position soon.  She also sts she got depressed and she stopped her meds for a couple of weeks but got back on them.  Reviewed risk of SJS and risk of medication not working as well with stop start pattern.  She agreed to remain on meds. Sts she has had some inc mood swings, some racing thoughts.  No ADRs/SE reported. Sleep and appetite reported as good.  She has reduced her drinking as well. On the PHQ9 score today was 6 [7]  GAD7 score today was 9 [5].  She had no SI/HI/delusions or hallucinations, no significant alon.  Stressors:  financial, relationship with her father     Family psychiatric history:  Father: depressed, Great aunt-schizophrenia, BPD: MGM, PGM  Relevant lab reviewed   cholesterol elevated, CBC/CMP nl ,  3/28 TSH nl   No EKG on file-no history of cardiac symptoms Has orders for labs today    Review of patient's allergies indicates:   Allergen Reactions    Pcn [penicillins]      Rash          Current Outpatient Medications:     FLUoxetine 40 MG capsule, Take 1 tablet every morning, Disp: 30 capsule, Rfl: 3    lamoTRIgine (LAMICTAL) 150 MG Tab, Take 1 tablet every 12 hours, Disp: 30 tablet, Rfl: 3    traZODone (DESYREL) 100 MG tablet, Take 1 to 1 1/2 tablets every night at bedtime, Disp: 45 tablet, Rfl: 3    valACYclovir (VALTREX) 500 MG tablet, Take 1 tablet (500 mg total) by mouth 2 (two) times daily., Disp: 60 tablet, Rfl: 11  Past Medical History:   Diagnosis Date    Anxiety     Depression     Pneumonia      Clinical presentation:  Appearance:  The client presented in casual attire evidencing good grooming and hygiene    Neuro:  the client was  alert, oriented x 4 and evidenced good judgement and insight.      Attention/concentration:  Was good in session    Memory:  The client had no subjective memory complaints and she was able to recall information discussed in session accurately    Judgement:  behavior is adequate to the situation    Fund of knowledge:  intact and appropriate to age and level of education    Gait/station:  was normal    Heart/lungs: no cardiac symptoms/normal effort    Skin warm and dry.     Extremities: were warm and dry, no edema    Mood:  was mildly dysthymic.  No SI/HI/delusions or hallucinations reported or suspected. No alon, some mood swings and some racing thoughts predominately depressed    Affect: was normal range    Speech:  normal rate and tone     Sleep: the client had no reported history of sleep problems.  Onset was normal, sts she is well rested.  No daytime sleepiness was reported    Appetite: was reported as good, eats regularly and is exercising several days/week    ETOH/Substance use history:  The client had no reported ETOH/or other substance use problems    Psychosocial history:  The client lives with her BF, has friends    Education:  Working on AS plans to get BS in [place]/business has not returned to school    Occupational history: worked as a [employer], she did quit second job at [email or web address], in school (18 hours) just lost a job    Education/session discussion:     · Reviewed RBA/meded for prescribed meds, including but not limited to teratogenicity, SJS. She evidenced good understanding of information shared in the session and had no questions.  · Reviewed the need for balance in her life, monitoring her sleep and mood and seeking help if symptoms escalate, has increased mood changes etc.     · Reviewed need for compliance    Impression: Bipolar disorder MRE depressed, improved mood clinically and subjectively.  No med changes planned    Plan:  Cont Prozac 40mg one tablet each morning  Cont Trazodone 100mg to 150mg hs  for sleep  Cont Lamictal 200 mg every 12 hours  Return to clinic 8w, call if problems    Session: [id number]  the client was seen face to face to assess their response to the medication prescribed, evaluate compliance, continue counseling and education and to coordinate care if needed. Greater than 50% of the time was spent counseling the client and coordinating care.

## 2020-03-19 ENCOUNTER — PATIENT OUTREACH (OUTPATIENT)
Dept: ADMINISTRATIVE | Facility: HOSPITAL | Age: 20
End: 2020-03-19

## 2020-03-19 NOTE — PROGRESS NOTES
Chart review completed 03/19/2020.  Care Everywhere updates requested and reviewed.  Immunizations reconciled. Media reviewed.   Patient not found in [facility]. 3-

## 2020-05-08 ENCOUNTER — TELEPHONE (OUTPATIENT)
Dept: PSYCHIATRY | Facility: CLINIC | Age: 20
End: 2020-05-08

## 2020-05-13 ENCOUNTER — TELEPHONE (OUTPATIENT)
Dept: PSYCHIATRY | Facility: CLINIC | Age: 20
End: 2020-05-13

## 2020-05-18 ENCOUNTER — PATIENT OUTREACH (OUTPATIENT)
Dept: ADMINISTRATIVE | Facility: HOSPITAL | Age: 20
End: 2020-05-18

## 2020-05-18 NOTE — PROGRESS NOTES
Chart review completed 05/18/2020.  Care Everywhere updates requested and reviewed.  Immunizations reconciled. Media reviewed.     Letter mailed. Portal Message Sent.

## 2020-05-18 NOTE — LETTER
May 26, 2020    [place] [address]             [facility]  [address]  [facility] [address]  Phone: [phone] [place] [address]     Dear, Mary Jones     [facility] is committed to your overall health.  To help you get the most out of each of your visits, we will review your information to make sure you are up to date on all of your recommended tests and/or procedures.  Flaca Laurent MD  has found that your chart shows you may be due for the following:     Pap Smear   TETANUS VACCINE     If you have had any of the above done at another facility, please bring the records with you or Fax them to [phone] so that your record at [facility] will be complete. If you have not had any of these tests or procedures done recently and would like to complete this testing ,  please call [phone] or send a message through your [facility] portal to your provider's office.     If you have an upcoming scheduled appointment for the above test and/or procedures, please disregard this letter.     If you are currently taking medication, please bring it with you to your appointment for review.     Thank you for letting us care for you,     Oswaldo Knapp, Care Coordinator   [facility]   Phone: [phone]   Fax: [phone]

## 2020-05-20 ENCOUNTER — PATIENT MESSAGE (OUTPATIENT)
Dept: PSYCHIATRY | Facility: CLINIC | Age: 20
End: 2020-05-20

## 2021-01-04 ENCOUNTER — PATIENT MESSAGE (OUTPATIENT)
Dept: ADMINISTRATIVE | Facility: HOSPITAL | Age: 21
End: 2021-01-04

## 2021-03-30 ENCOUNTER — PATIENT MESSAGE (OUTPATIENT)
Dept: FAMILY MEDICINE | Facility: CLINIC | Age: 21
End: 2021-03-30

## 2021-03-31 ENCOUNTER — PATIENT MESSAGE (OUTPATIENT)
Dept: FAMILY MEDICINE | Facility: CLINIC | Age: 21
End: 2021-03-31

## 2021-04-06 ENCOUNTER — PATIENT OUTREACH (OUTPATIENT)
Dept: ADMINISTRATIVE | Facility: HOSPITAL | Age: 21
End: 2021-04-06

## 2021-04-06 DIAGNOSIS — Z11.59 NEED FOR HEPATITIS C SCREENING TEST: Primary | ICD-10-CM

## 2021-04-07 NOTE — TELEPHONE ENCOUNTER
Pt is requesting medication refill on Prozac 40 mg, Lamictal 100 mg   Last refill: 4/22/19  Last visit: 4/22/19  Follow Up: none scheduled    
none

## 2021-04-23 ENCOUNTER — PATIENT MESSAGE (OUTPATIENT)
Dept: FAMILY MEDICINE | Facility: CLINIC | Age: 21
End: 2021-04-23

## 2021-05-11 ENCOUNTER — PATIENT MESSAGE (OUTPATIENT)
Dept: ADMINISTRATIVE | Facility: HOSPITAL | Age: 21
End: 2021-05-11

## 2021-05-11 ENCOUNTER — PATIENT OUTREACH (OUTPATIENT)
Dept: ADMINISTRATIVE | Facility: HOSPITAL | Age: 21
End: 2021-05-11

## 2021-05-18 ENCOUNTER — TELEPHONE (OUTPATIENT)
Dept: ADMINISTRATIVE | Facility: HOSPITAL | Age: 21
End: 2021-05-18

## 2021-07-06 ENCOUNTER — PATIENT MESSAGE (OUTPATIENT)
Dept: ADMINISTRATIVE | Facility: HOSPITAL | Age: 21
End: 2021-07-06

## 2021-10-07 ENCOUNTER — PATIENT MESSAGE (OUTPATIENT)
Dept: ADMINISTRATIVE | Facility: HOSPITAL | Age: 21
End: 2021-10-07

## 2023-07-05 ENCOUNTER — OFFICE VISIT (OUTPATIENT)
Dept: FAMILY MEDICINE | Facility: CLINIC | Age: 23
End: 2023-07-05
Payer: COMMERCIAL

## 2023-07-05 ENCOUNTER — TELEPHONE (OUTPATIENT)
Dept: NEUROLOGY | Facility: CLINIC | Age: 23
End: 2023-07-05
Payer: COMMERCIAL

## 2023-07-05 VITALS
SYSTOLIC BLOOD PRESSURE: 110 MMHG | HEART RATE: 96 BPM | BODY MASS INDEX: 26.26 KG/M2 | WEIGHT: 157.63 LBS | OXYGEN SATURATION: 100 % | HEIGHT: 65 IN | TEMPERATURE: 98 F | DIASTOLIC BLOOD PRESSURE: 70 MMHG

## 2023-07-05 DIAGNOSIS — F32.A ANXIETY AND DEPRESSION: ICD-10-CM

## 2023-07-05 DIAGNOSIS — Z00.00 ENCOUNTER FOR ANNUAL GENERAL MEDICAL EXAMINATION WITHOUT ABNORMAL FINDINGS IN ADULT: ICD-10-CM

## 2023-07-05 DIAGNOSIS — F31.31 BIPOLAR AFFECTIVE DISORDER, CURRENTLY DEPRESSED, MILD: Primary | ICD-10-CM

## 2023-07-05 DIAGNOSIS — G43.019 INTRACTABLE MIGRAINE WITHOUT AURA AND WITHOUT STATUS MIGRAINOSUS: ICD-10-CM

## 2023-07-05 DIAGNOSIS — Z12.4 ENCOUNTER FOR SCREENING FOR CERVICAL CANCER: ICD-10-CM

## 2023-07-05 DIAGNOSIS — F41.9 ANXIETY AND DEPRESSION: ICD-10-CM

## 2023-07-05 PROCEDURE — 99214 PR OFFICE/OUTPT VISIT, EST, LEVL IV, 30-39 MIN: ICD-10-PCS | Mod: S$GLB,,, | Performed by: STUDENT IN AN ORGANIZED HEALTH CARE EDUCATION/TRAINING PROGRAM

## 2023-07-05 PROCEDURE — 3078F DIAST BP <80 MM HG: CPT | Mod: CPTII,S$GLB,, | Performed by: STUDENT IN AN ORGANIZED HEALTH CARE EDUCATION/TRAINING PROGRAM

## 2023-07-05 PROCEDURE — 3078F PR MOST RECENT DIASTOLIC BLOOD PRESSURE < 80 MM HG: ICD-10-PCS | Mod: CPTII,S$GLB,, | Performed by: STUDENT IN AN ORGANIZED HEALTH CARE EDUCATION/TRAINING PROGRAM

## 2023-07-05 PROCEDURE — 99214 OFFICE O/P EST MOD 30 MIN: CPT | Mod: S$GLB,,, | Performed by: STUDENT IN AN ORGANIZED HEALTH CARE EDUCATION/TRAINING PROGRAM

## 2023-07-05 PROCEDURE — 3074F PR MOST RECENT SYSTOLIC BLOOD PRESSURE < 130 MM HG: ICD-10-PCS | Mod: CPTII,S$GLB,, | Performed by: STUDENT IN AN ORGANIZED HEALTH CARE EDUCATION/TRAINING PROGRAM

## 2023-07-05 PROCEDURE — 3008F BODY MASS INDEX DOCD: CPT | Mod: CPTII,S$GLB,, | Performed by: STUDENT IN AN ORGANIZED HEALTH CARE EDUCATION/TRAINING PROGRAM

## 2023-07-05 PROCEDURE — 99999 PR PBB SHADOW E&M-EST. PATIENT-LVL V: CPT | Mod: PBBFAC,,, | Performed by: STUDENT IN AN ORGANIZED HEALTH CARE EDUCATION/TRAINING PROGRAM

## 2023-07-05 PROCEDURE — 1160F RVW MEDS BY RX/DR IN RCRD: CPT | Mod: CPTII,S$GLB,, | Performed by: STUDENT IN AN ORGANIZED HEALTH CARE EDUCATION/TRAINING PROGRAM

## 2023-07-05 PROCEDURE — 99999 PR PBB SHADOW E&M-EST. PATIENT-LVL V: ICD-10-PCS | Mod: PBBFAC,,, | Performed by: STUDENT IN AN ORGANIZED HEALTH CARE EDUCATION/TRAINING PROGRAM

## 2023-07-05 PROCEDURE — 3074F SYST BP LT 130 MM HG: CPT | Mod: CPTII,S$GLB,, | Performed by: STUDENT IN AN ORGANIZED HEALTH CARE EDUCATION/TRAINING PROGRAM

## 2023-07-05 PROCEDURE — 1159F PR MEDICATION LIST DOCUMENTED IN MEDICAL RECORD: ICD-10-PCS | Mod: CPTII,S$GLB,, | Performed by: STUDENT IN AN ORGANIZED HEALTH CARE EDUCATION/TRAINING PROGRAM

## 2023-07-05 PROCEDURE — 3008F PR BODY MASS INDEX (BMI) DOCUMENTED: ICD-10-PCS | Mod: CPTII,S$GLB,, | Performed by: STUDENT IN AN ORGANIZED HEALTH CARE EDUCATION/TRAINING PROGRAM

## 2023-07-05 PROCEDURE — 1159F MED LIST DOCD IN RCRD: CPT | Mod: CPTII,S$GLB,, | Performed by: STUDENT IN AN ORGANIZED HEALTH CARE EDUCATION/TRAINING PROGRAM

## 2023-07-05 PROCEDURE — 1160F PR REVIEW ALL MEDS BY PRESCRIBER/CLIN PHARMACIST DOCUMENTED: ICD-10-PCS | Mod: CPTII,S$GLB,, | Performed by: STUDENT IN AN ORGANIZED HEALTH CARE EDUCATION/TRAINING PROGRAM

## 2023-07-05 RX ORDER — ONDANSETRON 8 MG/1
8 TABLET, ORALLY DISINTEGRATING ORAL EVERY 6 HOURS PRN
Qty: 30 TABLET | Refills: 0 | Status: SHIPPED | OUTPATIENT
Start: 2023-07-05 | End: 2023-07-15

## 2023-07-05 RX ORDER — IBUPROFEN 600 MG/1
600 TABLET ORAL EVERY 8 HOURS PRN
Qty: 30 TABLET | Refills: 0 | Status: SHIPPED | OUTPATIENT
Start: 2023-07-05 | End: 2023-07-15

## 2023-07-05 RX ORDER — FLUOXETINE HYDROCHLORIDE 40 MG/1
40 CAPSULE ORAL DAILY
Qty: 90 CAPSULE | Refills: 3 | Status: SHIPPED | OUTPATIENT
Start: 2023-07-05 | End: 2023-10-31 | Stop reason: ALTCHOICE

## 2023-07-05 RX ORDER — SUMATRIPTAN SUCCINATE 100 MG/1
TABLET ORAL
Qty: 9 TABLET | Refills: 6 | Status: SHIPPED | OUTPATIENT
Start: 2023-07-05 | End: 2023-09-19

## 2023-07-05 NOTE — PROGRESS NOTES
SUBJECTIVE:    CHIEF COMPLAINT:   Chief Complaint   Patient presents with    Dizziness     Headache. Vomiting. Referral for Psy.           274}    HISTORY OF PRESENT ILLNESS:  Mary Jones is a 22 y.o. female past medical history of bipolar disorder, anxiety and depression who presents to the clinic today .  She reports that she has had ongoing headache with a bandlike tightness around the site of a head radiating to the back of her head.  Symptoms are worse with light as well as associated with nausea and vomiting.  She denies prior history of migraines but has reports that she has had headaches for years since high school.  Denies chest pain, shortness a breath, fevers or chills.       PAST MEDICAL HISTORY:     274}  Past Medical History:   Diagnosis Date    Anxiety     Depression     Pneumonia 9/12       PAST SURGICAL HISTORY:  History reviewed. No pertinent surgical history.    SOCIAL HISTORY:  Social History     Socioeconomic History    Marital status: Single   Tobacco Use    Smoking status: Never   Substance and Sexual Activity    Alcohol use: Yes    Drug use: Yes     Frequency: 7.0 times per week     Types: Marijuana    Sexual activity: Yes     Birth control/protection: Condom   Social History Narrative    Lives with mom, dad, brothers.  No smokers.  10th grader.       FAMILY HISTORY:       Family History   Problem Relation Age of Onset    Rheum arthritis Mother     Cancer Maternal Grandmother     Heart disease Maternal Grandmother     Hypertension Maternal Grandmother     Cancer Maternal Grandfather     Heart disease Maternal Grandfather     Diabetes Maternal Grandfather     Celiac disease Paternal Grandmother        ALLERGIES AND MEDICATIONS: updated and reviewed.      274}  Review of patient's allergies indicates:   Allergen Reactions    Pcn [penicillins]      Rash        Medication List with Changes/Refills   New Medications    IBUPROFEN (ADVIL,MOTRIN) 600 MG TABLET    Take 1 tablet (600 mg total)  by mouth every 8 (eight) hours as needed for Pain (Headache).    ONDANSETRON (ZOFRAN-ODT) 8 MG TBDL    Take 1 tablet (8 mg total) by mouth every 6 (six) hours as needed (Nausea).    SUMATRIPTAN (IMITREX) 100 MG TABLET    Take 1 po at onset of migraine. May repeat once in 2 hrs if needed. No more than 2 pills in 24 hours   Changed and/or Refilled Medications    Modified Medication Previous Medication    FLUOXETINE 40 MG CAPSULE FLUoxetine 40 MG capsule       Take 1 capsule (40 mg total) by mouth once daily. Take 1 tablet every morning    Take 1 tablet every morning   Discontinued Medications    LAMOTRIGINE (LAMICTAL) 200 MG TABLET    Take 1 tablet every 12 hours    TRAZODONE (DESYREL) 100 MG TABLET    Take 1 to 1 1/2 tablets every night at bedtime    VALACYCLOVIR (VALTREX) 500 MG TABLET    Take 1 tablet (500 mg total) by mouth 2 (two) times daily.       SCREENING HISTORY:    274}  Health Maintenance         Date Due Completion Date    Hepatitis C Screening Never done ---    HIV Screening Never done ---    Chlamydia Screening Never done ---    Pap Smear Never done ---    TETANUS VACCINE 11/23/2019 11/23/2009    COVID-19 Vaccine (2 - Booster for Dee series) 02/15/2022 12/21/2021    Influenza Vaccine (1) 09/01/2023 11/23/2009            REVIEW OF SYSTEMS:   Review of Systems   Constitutional:  Negative for activity change, diaphoresis, fatigue, fever and unexpected weight change.   HENT:  Negative for postnasal drip and rhinorrhea.    Eyes:  Negative for photophobia and visual disturbance.   Respiratory:  Negative for cough, shortness of breath and wheezing.    Cardiovascular:  Negative for chest pain, palpitations and leg swelling.   Gastrointestinal:  Negative for abdominal distention, abdominal pain, constipation, diarrhea, nausea and vomiting.   Genitourinary:  Negative for bladder incontinence, difficulty urinating and frequency.   Musculoskeletal:  Negative for joint swelling and leg pain.   Integumentary:   "Negative for pallor and rash.   Neurological:  Negative for dizziness, weakness and numbness.     PHYSICAL EXAM:      274}  /70 (BP Location: Right arm, Patient Position: Sitting, BP Method: Medium (Manual))   Pulse 96   Temp 97.9 °F (36.6 °C) (Oral)   Ht 5' 5" (1.651 m)   Wt 71.5 kg (157 lb 10.1 oz)   SpO2 100%   BMI 26.23 kg/m²   Wt Readings from Last 3 Encounters:   07/05/23 71.5 kg (157 lb 10.1 oz)   11/25/19 75.9 kg (167 lb 6.4 oz)   10/22/19 76.2 kg (167 lb 15.9 oz)     BP Readings from Last 3 Encounters:   07/05/23 110/70   11/25/19 (!) 129/90   10/22/19 100/74     Estimated body mass index is 26.23 kg/m² as calculated from the following:    Height as of this encounter: 5' 5" (1.651 m).    Weight as of this encounter: 71.5 kg (157 lb 10.1 oz).     Physical Exam  Constitutional:       General: She is not in acute distress.     Appearance: Normal appearance. She is well-developed and normal weight. She is not ill-appearing.   HENT:      Head: Normocephalic and atraumatic.      Right Ear: External ear normal.      Left Ear: External ear normal.      Nose: Nose normal.   Eyes:      Extraocular Movements: Extraocular movements intact.      Conjunctiva/sclera: Conjunctivae normal.      Pupils: Pupils are equal, round, and reactive to light.   Neck:      Thyroid: No thyroid mass.   Cardiovascular:      Rate and Rhythm: Normal rate and regular rhythm.      Pulses: Normal pulses.      Heart sounds: Normal heart sounds, S1 normal and S2 normal. No murmur heard.    No gallop.   Pulmonary:      Effort: Pulmonary effort is normal. No respiratory distress.      Breath sounds: Normal breath sounds and air entry. No stridor. No wheezing, rhonchi or rales.   Abdominal:      General: Bowel sounds are normal. There is no distension.      Palpations: Abdomen is soft. There is no mass.      Tenderness: There is no abdominal tenderness.   Musculoskeletal:         General: No swelling or deformity. Normal range of " motion.      Cervical back: Normal range of motion and neck supple. No edema or erythema.   Skin:     General: Skin is warm and dry.      Findings: No lesion or rash.   Neurological:      General: No focal deficit present.      Mental Status: She is alert and oriented to person, place, and time. Mental status is at baseline.   Psychiatric:         Mood and Affect: Mood normal.         Behavior: Behavior normal. Behavior is cooperative.         Judgment: Judgment normal.       LABS:   274}  I have reviewed old labs below:  Lab Results   Component Value Date    WBC 4.82 09/20/2019    HGB 13.6 09/20/2019    HCT 42.2 09/20/2019    MCV 85 09/20/2019     09/20/2019     09/20/2019    K 3.9 09/20/2019     09/20/2019    CALCIUM 9.0 09/20/2019    CO2 27 09/20/2019    GLU 72 09/20/2019    BUN 6 09/20/2019    CREATININE 0.8 09/20/2019    ANIONGAP 9 09/20/2019    ESTGFRAFRICA >60.0 09/20/2019    EGFRNONAA >60.0 09/20/2019    PROT 7.6 09/20/2019    ALBUMIN 4.4 09/20/2019    BILITOT 0.3 09/20/2019    ALKPHOS 81 09/20/2019    ALT 19 09/20/2019    AST 29 09/20/2019    CHOL 192 09/20/2019    TRIG 82 09/20/2019    HDL 56 09/20/2019    LDLCALC 119.6 09/20/2019    TSH 1.594 03/28/2019       ASSESSMENT AND PLAN:  274}  1. Bipolar affective disorder, currently depressed, mild  -     FLUoxetine 40 MG capsule; Take 1 capsule (40 mg total) by mouth once daily. Take 1 tablet every morning  Dispense: 90 capsule; Refill: 3  -     E-Consult to Adult Psychiatry  -     Ambulatory referral/consult to Psychiatry; Future; Expected date: 07/12/2023    2. Anxiety and depression  -     FLUoxetine 40 MG capsule; Take 1 capsule (40 mg total) by mouth once daily. Take 1 tablet every morning  Dispense: 90 capsule; Refill: 3  -     E-Consult to Adult Psychiatry  -     Ambulatory referral/consult to Psychiatry; Future; Expected date: 07/12/2023    3. Intractable migraine without aura and without status migrainosus  -     sumatriptan  (IMITREX) 100 MG tablet; Take 1 po at onset of migraine. May repeat once in 2 hrs if needed. No more than 2 pills in 24 hours  Dispense: 9 tablet; Refill: 6  -     ibuprofen (ADVIL,MOTRIN) 600 MG tablet; Take 1 tablet (600 mg total) by mouth every 8 (eight) hours as needed for Pain (Headache).  Dispense: 30 tablet; Refill: 0  -     ondansetron (ZOFRAN-ODT) 8 MG TbDL; Take 1 tablet (8 mg total) by mouth every 6 (six) hours as needed (Nausea).  Dispense: 30 tablet; Refill: 0  -     Ambulatory referral/consult to Neurology; Future; Expected date: 07/12/2023    4. Encounter for annual general medical examination without abnormal findings in adult  -     Hepatitis C Antibody; Future; Expected date: 07/05/2023  -     HIV 1/2 Ag/Ab (4th Gen); Future; Expected date: 07/05/2023  -     TSH; Future; Expected date: 08/05/2023  -     Lipid Panel; Future; Expected date: 08/05/2023  -     Hemoglobin A1C; Future; Expected date: 07/19/2023  -     Comprehensive Metabolic Panel; Future; Expected date: 08/05/2023  -     CBC Without Differential; Future; Expected date: 08/05/2023    5. Encounter for screening for cervical cancer  -     Ambulatory referral/consult to Gynecology; Future; Expected date: 07/12/2023           Orders Placed This Encounter   Procedures    Hepatitis C Antibody    HIV 1/2 Ag/Ab (4th Gen)    TSH    Lipid Panel    Hemoglobin A1C    Comprehensive Metabolic Panel    CBC Without Differential    Ambulatory referral/consult to Gynecology    Ambulatory referral/consult to Psychiatry    Ambulatory referral/consult to Neurology       Follow up in about 6 months (around 1/5/2024). or sooner as needed.      I spent a total of 22 minutes on the day of the visit.  This includes face to face time and non-face to face time preparing to see the patient (eg, review of tests), obtaining and/or reviewing separately obtained history, documenting clinical information in the electronic or other health record, independently  interpreting results and communicating results to the patient/family/caregiver, or care coordinator.

## 2023-07-05 NOTE — TELEPHONE ENCOUNTER
Spoke to the pt, she will try to schedule an appt in h/a clinic when she gets her schedule via the portal.  She will call back if she needs assistance. Pt informed to schedule with Vu Ponce, Daisy Hay or Dr. Cortez for headaches.  She v/u.

## 2023-07-05 NOTE — PATIENT INSTRUCTIONS
Milton Hernandez,     If you are due for any health screening(s) below please notify me so we can arrange them to be ordered and scheduled to maintain your health. Most healthy patients complete it. Don't lose out on improving your health.     Tests to Keep You Healthy    Cervical Cancer Screening: DUE              Dear Ms. Jones:    Your [facility] Team is dedicated to helping you stay healthy with regularly scheduled recommended screenings.  Scheduling routine screenings is important to maintaining good health. Our records indicate that you may be overdue for your screening pap smear. A pap smear screening can help identify patients at risk for developing cervical cancer at an early stage, when it is most likely to be successfully treated.    We encourage you to schedule your appointment with your [facility] provider or some primary care providers also perform this screening.    If you have completed or scheduled your pap smear screening outside of [facility], please notify your primary care team so we can update your health record.      If you have questions or would like to schedule your screening, please contact your primary care clinic.    Sincerely,    Your [facility] Team

## 2023-07-12 ENCOUNTER — LAB VISIT (OUTPATIENT)
Dept: LAB | Facility: HOSPITAL | Age: 23
End: 2023-07-12
Attending: STUDENT IN AN ORGANIZED HEALTH CARE EDUCATION/TRAINING PROGRAM
Payer: COMMERCIAL

## 2023-07-12 DIAGNOSIS — Z00.00 ENCOUNTER FOR ANNUAL GENERAL MEDICAL EXAMINATION WITHOUT ABNORMAL FINDINGS IN ADULT: ICD-10-CM

## 2023-07-12 LAB
ERYTHROCYTE [DISTWIDTH] IN BLOOD BY AUTOMATED COUNT: 12.8 % (ref 11.5–14.5)
ESTIMATED AVG GLUCOSE: 88 MG/DL (ref 68–131)
HBA1C MFR BLD: 4.7 % (ref 4–5.6)
HCT VFR BLD AUTO: 39.8 % (ref 37–48.5)
HGB BLD-MCNC: 13.7 G/DL (ref 12–16)
MCH RBC QN AUTO: 29 PG (ref 27–31)
MCHC RBC AUTO-ENTMCNC: 34.4 G/DL (ref 32–36)
MCV RBC AUTO: 84 FL (ref 82–98)
PLATELET # BLD AUTO: 294 K/UL (ref 150–450)
PMV BLD AUTO: 9.3 FL (ref 9.2–12.9)
RBC # BLD AUTO: 4.72 M/UL (ref 4–5.4)
WBC # BLD AUTO: 6.3 K/UL (ref 3.9–12.7)

## 2023-07-12 PROCEDURE — 85027 COMPLETE CBC AUTOMATED: CPT | Performed by: STUDENT IN AN ORGANIZED HEALTH CARE EDUCATION/TRAINING PROGRAM

## 2023-07-12 PROCEDURE — 87389 HIV-1 AG W/HIV-1&-2 AB AG IA: CPT | Performed by: STUDENT IN AN ORGANIZED HEALTH CARE EDUCATION/TRAINING PROGRAM

## 2023-07-12 PROCEDURE — 86803 HEPATITIS C AB TEST: CPT | Performed by: STUDENT IN AN ORGANIZED HEALTH CARE EDUCATION/TRAINING PROGRAM

## 2023-07-12 PROCEDURE — 36415 COLL VENOUS BLD VENIPUNCTURE: CPT | Mod: PO | Performed by: STUDENT IN AN ORGANIZED HEALTH CARE EDUCATION/TRAINING PROGRAM

## 2023-07-12 PROCEDURE — 80053 COMPREHEN METABOLIC PANEL: CPT | Performed by: STUDENT IN AN ORGANIZED HEALTH CARE EDUCATION/TRAINING PROGRAM

## 2023-07-12 PROCEDURE — 84443 ASSAY THYROID STIM HORMONE: CPT | Performed by: STUDENT IN AN ORGANIZED HEALTH CARE EDUCATION/TRAINING PROGRAM

## 2023-07-12 PROCEDURE — 83036 HEMOGLOBIN GLYCOSYLATED A1C: CPT | Performed by: STUDENT IN AN ORGANIZED HEALTH CARE EDUCATION/TRAINING PROGRAM

## 2023-07-12 PROCEDURE — 80061 LIPID PANEL: CPT | Performed by: STUDENT IN AN ORGANIZED HEALTH CARE EDUCATION/TRAINING PROGRAM

## 2023-07-13 LAB
ALBUMIN SERPL BCP-MCNC: 4.2 G/DL (ref 3.5–5.2)
ALP SERPL-CCNC: 88 U/L (ref 55–135)
ALT SERPL W/O P-5'-P-CCNC: 85 U/L (ref 10–44)
ANION GAP SERPL CALC-SCNC: 8 MMOL/L (ref 8–16)
AST SERPL-CCNC: 50 U/L (ref 10–40)
BILIRUB SERPL-MCNC: 0.9 MG/DL (ref 0.1–1)
BUN SERPL-MCNC: 10 MG/DL (ref 6–20)
CALCIUM SERPL-MCNC: 9.3 MG/DL (ref 8.7–10.5)
CHLORIDE SERPL-SCNC: 106 MMOL/L (ref 95–110)
CHOLEST SERPL-MCNC: 163 MG/DL (ref 120–199)
CHOLEST/HDLC SERPL: 4.3 {RATIO} (ref 2–5)
CO2 SERPL-SCNC: 23 MMOL/L (ref 23–29)
CREAT SERPL-MCNC: 0.8 MG/DL (ref 0.5–1.4)
EST. GFR  (NO RACE VARIABLE): >60 ML/MIN/1.73 M^2
GLUCOSE SERPL-MCNC: 81 MG/DL (ref 70–110)
HCV AB SERPL QL IA: NORMAL
HDLC SERPL-MCNC: 38 MG/DL (ref 40–75)
HDLC SERPL: 23.3 % (ref 20–50)
HIV 1+2 AB+HIV1 P24 AG SERPL QL IA: NORMAL
LDLC SERPL CALC-MCNC: 116.4 MG/DL (ref 63–159)
NONHDLC SERPL-MCNC: 125 MG/DL
POTASSIUM SERPL-SCNC: 4 MMOL/L (ref 3.5–5.1)
PROT SERPL-MCNC: 7.1 G/DL (ref 6–8.4)
SODIUM SERPL-SCNC: 137 MMOL/L (ref 136–145)
TRIGL SERPL-MCNC: 43 MG/DL (ref 30–150)
TSH SERPL DL<=0.005 MIU/L-ACNC: 1.62 UIU/ML (ref 0.4–4)

## 2023-07-14 DIAGNOSIS — R79.89 ABNORMAL LFTS (LIVER FUNCTION TESTS): Primary | ICD-10-CM

## 2023-07-19 ENCOUNTER — TELEPHONE (OUTPATIENT)
Dept: FAMILY MEDICINE | Facility: CLINIC | Age: 23
End: 2023-07-19
Payer: COMMERCIAL

## 2023-07-19 NOTE — TELEPHONE ENCOUNTER
----- Message from Michelle Madden DO sent at 7/14/2023  5:28 PM CDT -----  Please call patient to to inform her that her labs were reviewed.  Most results are normal range however she had a slight elevation of her liver enzymes please confirm if she occasionally uses Tylenol for headaches and/or consumes alcohol.  Occasionally this elevation may be seen of alcohol consumption or use of Tylenol in conjunction with alcohol consumption.  Would like to repeat this testing within a few months

## 2023-07-19 NOTE — TELEPHONE ENCOUNTER
Call placed to patient for notification. Patient verbalized understanding, states she does occasionally use Tylenol. Also states she has not consumed any alcohol in quite some time. Patient states she will call back to schedule follow up lab appointment as she was at work at the time of our call. Will send follow up message to Dr. Madden for notification.

## 2023-07-24 ENCOUNTER — PATIENT MESSAGE (OUTPATIENT)
Dept: FAMILY MEDICINE | Facility: CLINIC | Age: 23
End: 2023-07-24
Payer: COMMERCIAL

## 2023-08-14 ENCOUNTER — TELEPHONE (OUTPATIENT)
Dept: FAMILY MEDICINE | Facility: CLINIC | Age: 23
End: 2023-08-14
Payer: COMMERCIAL

## 2023-09-19 ENCOUNTER — OFFICE VISIT (OUTPATIENT)
Dept: FAMILY MEDICINE | Facility: CLINIC | Age: 23
End: 2023-09-19
Payer: COMMERCIAL

## 2023-09-19 VITALS
SYSTOLIC BLOOD PRESSURE: 110 MMHG | TEMPERATURE: 101 F | HEART RATE: 116 BPM | WEIGHT: 153.88 LBS | OXYGEN SATURATION: 98 % | BODY MASS INDEX: 25.64 KG/M2 | DIASTOLIC BLOOD PRESSURE: 82 MMHG | HEIGHT: 65 IN

## 2023-09-19 DIAGNOSIS — G43.C0 PERIODIC HEADACHE SYNDROME, NOT INTRACTABLE: ICD-10-CM

## 2023-09-19 DIAGNOSIS — M79.10 MYALGIA: Primary | ICD-10-CM

## 2023-09-19 LAB
CTP QC/QA: YES
CTP QC/QA: YES
GROUP A STREP, MOLECULAR: NEGATIVE
POC MOLECULAR INFLUENZA A AGN: NEGATIVE
POC MOLECULAR INFLUENZA B AGN: NEGATIVE
SARS-COV-2 RDRP RESP QL NAA+PROBE: POSITIVE

## 2023-09-19 PROCEDURE — 87635 SARS-COV-2 COVID-19 AMP PRB: CPT | Mod: QW,S$GLB,, | Performed by: STUDENT IN AN ORGANIZED HEALTH CARE EDUCATION/TRAINING PROGRAM

## 2023-09-19 PROCEDURE — 99999 PR PBB SHADOW E&M-EST. PATIENT-LVL III: ICD-10-PCS | Mod: PBBFAC,,, | Performed by: STUDENT IN AN ORGANIZED HEALTH CARE EDUCATION/TRAINING PROGRAM

## 2023-09-19 PROCEDURE — 99999 PR PBB SHADOW E&M-EST. PATIENT-LVL III: CPT | Mod: PBBFAC,,, | Performed by: STUDENT IN AN ORGANIZED HEALTH CARE EDUCATION/TRAINING PROGRAM

## 2023-09-19 PROCEDURE — 99214 OFFICE O/P EST MOD 30 MIN: CPT | Mod: S$GLB,,, | Performed by: STUDENT IN AN ORGANIZED HEALTH CARE EDUCATION/TRAINING PROGRAM

## 2023-09-19 PROCEDURE — 3074F SYST BP LT 130 MM HG: CPT | Mod: CPTII,S$GLB,, | Performed by: STUDENT IN AN ORGANIZED HEALTH CARE EDUCATION/TRAINING PROGRAM

## 2023-09-19 PROCEDURE — 87502 POCT INFLUENZA A/B MOLECULAR: ICD-10-PCS | Mod: QW,S$GLB,, | Performed by: STUDENT IN AN ORGANIZED HEALTH CARE EDUCATION/TRAINING PROGRAM

## 2023-09-19 PROCEDURE — 3074F PR MOST RECENT SYSTOLIC BLOOD PRESSURE < 130 MM HG: ICD-10-PCS | Mod: CPTII,S$GLB,, | Performed by: STUDENT IN AN ORGANIZED HEALTH CARE EDUCATION/TRAINING PROGRAM

## 2023-09-19 PROCEDURE — 87635: ICD-10-PCS | Mod: QW,S$GLB,, | Performed by: STUDENT IN AN ORGANIZED HEALTH CARE EDUCATION/TRAINING PROGRAM

## 2023-09-19 PROCEDURE — 87502 INFLUENZA DNA AMP PROBE: CPT | Mod: QW,S$GLB,, | Performed by: STUDENT IN AN ORGANIZED HEALTH CARE EDUCATION/TRAINING PROGRAM

## 2023-09-19 PROCEDURE — 1159F MED LIST DOCD IN RCRD: CPT | Mod: CPTII,S$GLB,, | Performed by: STUDENT IN AN ORGANIZED HEALTH CARE EDUCATION/TRAINING PROGRAM

## 2023-09-19 PROCEDURE — 99214 PR OFFICE/OUTPT VISIT, EST, LEVL IV, 30-39 MIN: ICD-10-PCS | Mod: S$GLB,,, | Performed by: STUDENT IN AN ORGANIZED HEALTH CARE EDUCATION/TRAINING PROGRAM

## 2023-09-19 PROCEDURE — 87651 STREP A DNA AMP PROBE: CPT | Mod: PO | Performed by: STUDENT IN AN ORGANIZED HEALTH CARE EDUCATION/TRAINING PROGRAM

## 2023-09-19 PROCEDURE — 3044F PR MOST RECENT HEMOGLOBIN A1C LEVEL <7.0%: ICD-10-PCS | Mod: CPTII,S$GLB,, | Performed by: STUDENT IN AN ORGANIZED HEALTH CARE EDUCATION/TRAINING PROGRAM

## 2023-09-19 PROCEDURE — 1160F PR REVIEW ALL MEDS BY PRESCRIBER/CLIN PHARMACIST DOCUMENTED: ICD-10-PCS | Mod: CPTII,S$GLB,, | Performed by: STUDENT IN AN ORGANIZED HEALTH CARE EDUCATION/TRAINING PROGRAM

## 2023-09-19 PROCEDURE — 3079F DIAST BP 80-89 MM HG: CPT | Mod: CPTII,S$GLB,, | Performed by: STUDENT IN AN ORGANIZED HEALTH CARE EDUCATION/TRAINING PROGRAM

## 2023-09-19 PROCEDURE — 3044F HG A1C LEVEL LT 7.0%: CPT | Mod: CPTII,S$GLB,, | Performed by: STUDENT IN AN ORGANIZED HEALTH CARE EDUCATION/TRAINING PROGRAM

## 2023-09-19 PROCEDURE — 3079F PR MOST RECENT DIASTOLIC BLOOD PRESSURE 80-89 MM HG: ICD-10-PCS | Mod: CPTII,S$GLB,, | Performed by: STUDENT IN AN ORGANIZED HEALTH CARE EDUCATION/TRAINING PROGRAM

## 2023-09-19 PROCEDURE — 3008F PR BODY MASS INDEX (BMI) DOCUMENTED: ICD-10-PCS | Mod: CPTII,S$GLB,, | Performed by: STUDENT IN AN ORGANIZED HEALTH CARE EDUCATION/TRAINING PROGRAM

## 2023-09-19 PROCEDURE — 3008F BODY MASS INDEX DOCD: CPT | Mod: CPTII,S$GLB,, | Performed by: STUDENT IN AN ORGANIZED HEALTH CARE EDUCATION/TRAINING PROGRAM

## 2023-09-19 PROCEDURE — 1159F PR MEDICATION LIST DOCUMENTED IN MEDICAL RECORD: ICD-10-PCS | Mod: CPTII,S$GLB,, | Performed by: STUDENT IN AN ORGANIZED HEALTH CARE EDUCATION/TRAINING PROGRAM

## 2023-09-19 PROCEDURE — 1160F RVW MEDS BY RX/DR IN RCRD: CPT | Mod: CPTII,S$GLB,, | Performed by: STUDENT IN AN ORGANIZED HEALTH CARE EDUCATION/TRAINING PROGRAM

## 2023-09-19 RX ORDER — SUMATRIPTAN SUCCINATE 25 MG/1
50 TABLET ORAL 3 TIMES DAILY PRN
Qty: 60 TABLET | Refills: 0 | Status: SHIPPED | OUTPATIENT
Start: 2023-09-19 | End: 2023-10-19

## 2023-09-19 RX ORDER — IBUPROFEN 600 MG/1
600 TABLET ORAL 3 TIMES DAILY
Qty: 30 TABLET | Refills: 0 | Status: SHIPPED | OUTPATIENT
Start: 2023-09-19 | End: 2023-09-29

## 2023-09-19 RX ORDER — ONDANSETRON 8 MG/1
8 TABLET, ORALLY DISINTEGRATING ORAL 3 TIMES DAILY
Qty: 30 TABLET | Refills: 3 | Status: SHIPPED | OUTPATIENT
Start: 2023-09-19 | End: 2023-10-09

## 2023-09-19 NOTE — PROGRESS NOTES
SUBJECTIVE:    CHIEF COMPLAINT:   Chief Complaint   Patient presents with    Generalized Body Aches    Migraine           274}    HISTORY OF PRESENT ILLNESS:  Mary Jones is a 23 y.o. female who presents to the clinic today for evaluation of malaise fatigue, myalgias, worsening for the past two days. Sx associated with a sore throat, nausea and vomiting and chills.. Denies chest pain, abdominal pain,shortness of breath, dysuria, shortness of breath.      PAST MEDICAL HISTORY:     274}  Past Medical History:   Diagnosis Date    Anxiety     Depression     Pneumonia 9/12       PAST SURGICAL HISTORY:  History reviewed. No pertinent surgical history.    SOCIAL HISTORY:  Social History     Socioeconomic History    Marital status: Single   Tobacco Use    Smoking status: Never   Substance and Sexual Activity    Alcohol use: Yes    Drug use: Yes     Frequency: 7.0 times per week     Types: Marijuana    Sexual activity: Yes     Birth control/protection: Condom   Social History Narrative    Lives with mom, dad, brothers.  No smokers.  10th grader.       FAMILY HISTORY:       Family History   Problem Relation Age of Onset    Rheum arthritis Mother     Cancer Maternal Grandmother     Heart disease Maternal Grandmother     Hypertension Maternal Grandmother     Cancer Maternal Grandfather     Heart disease Maternal Grandfather     Diabetes Maternal Grandfather     Celiac disease Paternal Grandmother        ALLERGIES AND MEDICATIONS: updated and reviewed.      274}  Review of patient's allergies indicates:   Allergen Reactions    Pcn [penicillins]      Rash        Medication List with Changes/Refills   New Medications    IBUPROFEN (ADVIL,MOTRIN) 600 MG TABLET    Take 1 tablet (600 mg total) by mouth 3 (three) times daily. for 10 days    ONDANSETRON (ZOFRAN-ODT) 8 MG TBDL    Take 1 tablet (8 mg total) by mouth 3 (three) times daily. for 20 days    SUMATRIPTAN (IMITREX) 25 MG TAB    Take 2 tablets (50 mg total) by mouth 3  "(three) times daily as needed (Headache).   Current Medications    FLUOXETINE 40 MG CAPSULE    Take 1 capsule (40 mg total) by mouth once daily. Take 1 tablet every morning   Discontinued Medications    SUMATRIPTAN (IMITREX) 100 MG TABLET    Take 1 po at onset of migraine. May repeat once in 2 hrs if needed. No more than 2 pills in 24 hours       SCREENING HISTORY:    274}  Health Maintenance         Date Due Completion Date    Pap Smear Never done ---    TETANUS VACCINE 11/23/2019 11/23/2009    COVID-19 Vaccine (2 - Booster for Dee series) 02/15/2022 12/21/2021    Influenza Vaccine (1) 09/01/2023 11/23/2009            REVIEW OF SYSTEMS:   Review of Systems   Constitutional:  Positive for chills and fatigue.   Respiratory:  Negative for chest tightness and shortness of breath.    Cardiovascular:  Negative for chest pain.   Musculoskeletal:  Positive for arthralgias and myalgias.   All other systems reviewed and are negative.      PHYSICAL EXAM:      274}  /82 (BP Location: Right arm, Patient Position: Sitting, BP Method: Small (Manual))   Pulse (!) 116   Temp (!) 100.8 °F (38.2 °C)   Ht 5' 5" (1.651 m)   Wt 69.8 kg (153 lb 14.1 oz)   SpO2 98%   BMI 25.61 kg/m²   Wt Readings from Last 3 Encounters:   09/19/23 69.8 kg (153 lb 14.1 oz)   07/05/23 71.5 kg (157 lb 10.1 oz)   03/13/20 81.3 kg (179 lb 3.7 oz)     BP Readings from Last 3 Encounters:   09/19/23 110/82   07/05/23 110/70   03/13/20 122/82     Estimated body mass index is 25.61 kg/m² as calculated from the following:    Height as of this encounter: 5' 5" (1.651 m).    Weight as of this encounter: 69.8 kg (153 lb 14.1 oz).     Physical Exam  Vitals reviewed.   Constitutional:       Appearance: Normal appearance.   HENT:      Head: Normocephalic and atraumatic.      Mouth/Throat:      Mouth: Mucous membranes are moist.      Pharynx: No oropharyngeal exudate or posterior oropharyngeal erythema.   Eyes:      Extraocular Movements: Extraocular " movements intact.      Pupils: Pupils are equal, round, and reactive to light.   Cardiovascular:      Rate and Rhythm: Regular rhythm. Tachycardia present.      Heart sounds: No murmur heard.     No gallop.   Pulmonary:      Effort: Pulmonary effort is normal. No respiratory distress.      Breath sounds: Normal breath sounds. No stridor. No wheezing.   Musculoskeletal:         General: Normal range of motion.   Neurological:      Mental Status: She is alert.         LABS:   274}  I have reviewed old labs below:  Lab Results   Component Value Date    WBC 6.30 07/12/2023    HGB 13.7 07/12/2023    HCT 39.8 07/12/2023    MCV 84 07/12/2023     07/12/2023     07/12/2023    K 4.0 07/12/2023     07/12/2023    CALCIUM 9.3 07/12/2023    CO2 23 07/12/2023    GLU 81 07/12/2023    BUN 10 07/12/2023    CREATININE 0.8 07/12/2023    ANIONGAP 8 07/12/2023    ESTGFRAFRICA >60.0 09/20/2019    EGFRNONAA >60.0 09/20/2019    PROT 7.1 07/12/2023    ALBUMIN 4.2 07/12/2023    BILITOT 0.9 07/12/2023    ALKPHOS 88 07/12/2023    ALT 85 (H) 07/12/2023    AST 50 (H) 07/12/2023    CHOL 163 07/12/2023    TRIG 43 07/12/2023    HDL 38 (L) 07/12/2023    LDLCALC 116.4 07/12/2023    TSH 1.618 07/12/2023    HGBA1C 4.7 07/12/2023       ASSESSMENT AND PLAN:  274}  1. Myalgia  -     POCT COVID-19 Rapid Screening  -     Group A Strep, Molecular  -     POCT Influenza A/B Molecular    2. Periodic headache syndrome, not intractable  -     ondansetron (ZOFRAN-ODT) 8 MG TbDL; Take 1 tablet (8 mg total) by mouth 3 (three) times daily. for 20 days  Dispense: 30 tablet; Refill: 3  -     sumatriptan (IMITREX) 25 MG Tab; Take 2 tablets (50 mg total) by mouth 3 (three) times daily as needed (Headache).  Dispense: 60 tablet; Refill: 0  -     ibuprofen (ADVIL,MOTRIN) 600 MG tablet; Take 1 tablet (600 mg total) by mouth 3 (three) times daily. for 10 days  Dispense: 30 tablet; Refill: 0           Orders Placed This Encounter   Procedures    Group A  Strep, Molecular    POCT COVID-19 Rapid Screening    POCT Influenza A/B Molecular       Follow up if symptoms worsen or fail to improve. or sooner as needed.

## 2023-09-19 NOTE — PATIENT INSTRUCTIONS
Milton Hernandez,     If you are due for any health screening(s) below please notify me so we can arrange them to be ordered and scheduled to maintain your health. Most healthy patients complete it. Don't lose out on improving your health.     Tests to Keep You Healthy    Cervical Cancer Screening: DUE              Dear Ms. Jones:    Your [facility] Team is dedicated to helping you stay healthy with regularly scheduled recommended screenings.  Scheduling routine screenings is important to maintaining good health. Our records indicate that you may be overdue for your screening pap smear. A pap smear screening can help identify patients at risk for developing cervical cancer at an early stage, when it is most likely to be successfully treated.    We encourage you to schedule your appointment with your [facility] provider or some primary care providers also perform this screening.    If you have completed or scheduled your pap smear screening outside of [facility], please notify your primary care team so we can update your health record.      If you have questions or would like to schedule your screening, please contact your primary care clinic.    Sincerely,    Your [facility] Team

## 2023-09-20 ENCOUNTER — TELEPHONE (OUTPATIENT)
Dept: FAMILY MEDICINE | Facility: CLINIC | Age: 23
End: 2023-09-20
Payer: COMMERCIAL

## 2023-09-20 DIAGNOSIS — F31.31 BIPOLAR AFFECTIVE DISORDER, CURRENTLY DEPRESSED, MILD: Primary | ICD-10-CM

## 2023-09-20 NOTE — TELEPHONE ENCOUNTER
Spoke to patient. Patient asked if she can get a work note when to return to work. Symptoms of fever started yesterday and sore throat and body aches three days ago. Patient also wanted a referral to psychiatry.

## 2023-09-21 ENCOUNTER — TELEPHONE (OUTPATIENT)
Dept: FAMILY MEDICINE | Facility: CLINIC | Age: 23
End: 2023-09-21
Payer: COMMERCIAL

## 2023-09-21 NOTE — TELEPHONE ENCOUNTER
Spoke to patient I read Dr. Madden message to patient. I told patient her doctors note is now ready.

## 2023-09-21 NOTE — TELEPHONE ENCOUNTER
"Patient requesting to follow up on message routed to Dr. Madden on yesterday by VANI Hua. Patient states "My job is hounding me for a letter stating I tested positive for Covid. I need a letter stating when I can return to work."  Patient would like letter by close of business today as she fears she will be in trouble at work. Will send urgent message to Dr. Madden. Will also send message to VANI Hua for follow up.   "

## 2023-09-21 NOTE — TELEPHONE ENCOUNTER
Letter noted in Epic. Call placed to patient for notification. Patient states she was able to view letter in [facility].         Michelle Madden., DO  You 34 minutes ago (1:23 PM)     LB  Yes please rest assured patient that we will be creating a letter for her.  Due to the testing being late in the day we were not able to provided to her after she was finished with the visit.  Cyndy will be creating this letter on today she can pick it up.

## 2023-09-21 NOTE — TELEPHONE ENCOUNTER
Kendal Rodriguez Staff    ----- Message from Kendal Rodriguez sent at 9/21/2023  8:52 AM CDT -----  Contact: Patient  Type:  Patient Returning Call    Who Called:  Patient  Who Left Message for Patient:  Cyndy  Does the patient know what this is regarding?:   Note for work    Would the patient rather a call back or a response via [facility]?   Call back  Best Call Back Number:  [phone]    Additional Information:   States she is returning a missed call - states she would like to speak with Cyndy regarding the note she needs for work at the earliest - please call to advise - thank you

## 2023-09-25 ENCOUNTER — TELEPHONE (OUTPATIENT)
Dept: FAMILY MEDICINE | Facility: CLINIC | Age: 23
End: 2023-09-25
Payer: COMMERCIAL

## 2023-09-25 NOTE — TELEPHONE ENCOUNTER
I called patient and read Dr. Madden message to patient. Patient had no further questions and verbalized understanding.

## 2023-10-31 ENCOUNTER — OFFICE VISIT (OUTPATIENT)
Dept: PSYCHIATRY | Facility: CLINIC | Age: 23
End: 2023-10-31
Payer: COMMERCIAL

## 2023-10-31 DIAGNOSIS — F31.81 BIPOLAR II DISORDER, MOST RECENT EPISODE MAJOR DEPRESSIVE: Primary | ICD-10-CM

## 2023-10-31 DIAGNOSIS — F41.0 PANIC DISORDER: ICD-10-CM

## 2023-10-31 DIAGNOSIS — F41.9 ANXIETY DISORDER, UNSPECIFIED TYPE: ICD-10-CM

## 2023-10-31 PROCEDURE — 3044F HG A1C LEVEL LT 7.0%: CPT | Mod: CPTII,95,, | Performed by: PSYCHOLOGIST

## 2023-10-31 PROCEDURE — 1159F MED LIST DOCD IN RCRD: CPT | Mod: CPTII,95,, | Performed by: PSYCHOLOGIST

## 2023-10-31 PROCEDURE — 3044F PR MOST RECENT HEMOGLOBIN A1C LEVEL <7.0%: ICD-10-PCS | Mod: CPTII,95,, | Performed by: PSYCHOLOGIST

## 2023-10-31 PROCEDURE — 1159F PR MEDICATION LIST DOCUMENTED IN MEDICAL RECORD: ICD-10-PCS | Mod: CPTII,95,, | Performed by: PSYCHOLOGIST

## 2023-10-31 PROCEDURE — 90792 PSYCH DIAG EVAL W/MED SRVCS: CPT | Mod: 95,,, | Performed by: PSYCHOLOGIST

## 2023-10-31 PROCEDURE — 90792 PR PSYCHIATRIC DIAGNOSTIC EVALUATION W/MEDICAL SERVICES: ICD-10-PCS | Mod: 95,,, | Performed by: PSYCHOLOGIST

## 2023-10-31 RX ORDER — ARIPIPRAZOLE 2 MG/1
2 TABLET ORAL DAILY
Qty: 30 TABLET | Refills: 1 | Status: SHIPPED | OUTPATIENT
Start: 2023-10-31 | End: 2023-12-26

## 2023-10-31 RX ORDER — DESVENLAFAXINE 50 MG/1
50 TABLET, EXTENDED RELEASE ORAL DAILY
Qty: 30 TABLET | Refills: 1 | Status: SHIPPED | OUTPATIENT
Start: 2023-10-31 | End: 2023-12-26
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
## 2023-11-01 ENCOUNTER — PATIENT MESSAGE (OUTPATIENT)
Dept: PSYCHIATRY | Facility: CLINIC | Age: 23
End: 2023-11-01
Payer: COMMERCIAL

## 2023-11-07 ENCOUNTER — TELEPHONE (OUTPATIENT)
Dept: PSYCHIATRY | Facility: CLINIC | Age: 23
End: 2023-11-07
Payer: COMMERCIAL

## 2023-11-07 NOTE — TELEPHONE ENCOUNTER
For documentation purposes          CaseId:[id number];Status:Approved;   Prior Auth;Coverage   Start Date:11/07/2023;Coverage End Date:11/06/2024    Pharmacy and patient notified.  Lazara

## 2023-12-25 NOTE — PROGRESS NOTES
"The patient location is:  [place], [place]  The patient location [place] is: [place]  The patient phone number is: [phone]  Visit type: Virtual visit with synchronous audio and video  Each patient to whom he or she provides medical services by telemedicine is:  (1) informed of the relationship between the physician and patient and the respective role of any other health care provider with respect to management of the patient; and (2) notified that he or she may decline to receive medical services by telemedicine and may withdraw from such care at any time.     Outpatient Psychiatry Follow-Up Visit    Clinical Status of Patient: Outpatient (Ambulatory)  12/26/2023     Chief Complaint:  presenting today for a follow-up.       Interval History and Content of Current Session:  Interim Events/Subjective Report/Content of Current Session:  follow-up appointment.    Pt is a 24 y/o female with past psychiatric hx of Bipolar Disorder and anxiety who presents for follow-up treatment. Pt reported some "restlessness" (akathisia?) for about 3-5 days after starting the aripiprazole but it did subside. Pt noted decreased anxiety and mood symptoms initially, but stated that they increased during the holidays. Pt stated that she "always struggles" during the holiday season but symptoms improve soon thereafter. Noted some fleeting SI during the holidays but denied any plan or intent. Was able to discuss with support individuals. Stated that she has been able to leave the house without panic attacks and has improved appetite. Will be moving to [place] for a new job sometime in 2024 but not sure when at this time.    Past Psychiatric hx: fluoxetine, lamotrigine 150 mg BID, trazodone (VH), quetiapine (gained weight, increased depression), Zoloft (diarrhea), Lexapro     Past Medical hx:   Past Medical History:   Diagnosis Date    Anxiety     Depression     Pneumonia 9/12        Interim hx:  Medication changes last visit: cross " taper fluoxetine to Pristiq and start aripiprazole 2 mg   Anxiety: 7/10 (during holidays), 4/10 just prior to that  Depression: 8/10 (during holidays, 4/10 just prior to that     Denies suicidal/homicidal ideations.  Denies hopelessness/worthlessness.    Denies auditory/visual hallucinations      Alcohol: Infrequent use  Drug: Pt denies  Tobacco: Pt denies      Review of Systems   PSYCHIATRIC: Pertinent items are noted in the narrative.        CONSTITUTIONAL: weight stable    Past Medical, Family and Social History: The patient's past medical, family and social history have been reviewed and updated as appropriate within the electronic medical record. See encounter notes.     Current Psychiatric Medication:  Pristiq 50 mg, aripiprazole 2 mg     Compliance: yes      Side effects: Initially some akathisia      Risk Parameters:  Patient reports no suicidal ideation  Patient reports no homicidal ideation  Patient reports no self-injurious behavior  Patient reports no violent behavior     Exam (detailed: at least 9 elements; comprehensive: all 15 elements)   Constitutional  Vitals:  Most recent vital signs, dated less than 90 days prior to this appointment, were reviewed.        General:  unremarkable, age appropriate, casual attire, good eye contact, good rapport       Musculoskeletal  Muscle Strength/Tone:  no flaccidity, no tremor    Gait & Station:  normal      Psychiatric                       Speech:  normal tone, normal rate, rhythm, and volume   Mood & Affect:   Depressed, anxious         Thought Process:   Goal directed; Linear    Associations:   intact   Thought Content:   No SI/HI, delusions, or paranoia, no AV/VH   Insight & Judgement:   Good, adequate to circumstances   Orientation:   grossly intact; alert and oriented x 4    Memory:  intact for content of interview    Language:  grossly intact, can repeat    Attention Span  : Grossly intact for content of interview   Fund of Knowledge:   intact and  appropriate to age and level of education        Assessment and Diagnosis   Status/Progress: Based on the examination today, the patient's problem(s) is/are adequately controlled.  New problems have not been presented today. Co-morbidities are not complicating management of the primary condition. There are no active rule-out diagnoses for this patient at this time.      Impression: Pt appears to be responding the trials of the medication as her symptoms improved prior to increased stress during the holidays. Pt may be moving out of state as early as February. Will increase dose of aripiprazole and Pristiq to hopefully stabilize mood and anxiety prior to any transition out of state.     Diagnosis:   1) Bipolar II Disorder, MRE Depressed  2) Anxiety Disorder  3) Panic Disorder  4) Hx of PTSD  Intervention/Counseling/Treatment Plan   Medication Management:      1. Increase Pristiq 100 mg     2. Increase Aripiprazole to 5 mg     3. Call to report any worsening of symptoms or problems with the medication. Pt instructed to go to ER with thoughts of harming self, others     4. Patient given contact # for psychotherapists at [place] and/or [facility] and also instructed to check with insurance for list of providers.     Psychotherapy: None  Target symptoms:   Why chosen therapy is appropriate versus another modality: CBT used; relevant to diagnosis, patient responds to this modality  Outcome monitoring methods: self-report, observation  Therapeutic intervention type: Cognitive Behavioral Therapy  Topics discussed/themes: building skills sets for symptom management, symptom recognition, nutrition, exercise  The patient's response to the intervention is   Patient's response to treatment is: good.   The patient's progress toward treatment goals: improving     Return to clinic: 1 month    -Cognitive-Behavioral/Supportive therapy and psychoeducation provided  -R/B/SE's of medications discussed with the pt who expresses  understanding and chooses to take medications as prescribed.   -Pt instructed to call clinic, 911 or go to nearest emergency room if sxs worsen or pt is in   crisis. The pt expresses understanding.    Damon Orona, PhD, MP     Antidepressant/Antianxiety Medication Initiation:  Patient informed of risks, benefits, and potential side effects of medication and accepts informed consent.  Common side effects include nausea, fatigue, headache, insomnia., Specifically discussed the possibility of new or worsening suicidal thoughts/depression.  Patient instructed to stop the medication immediately and seek urgent treatment if this occurs. Patient instructed not to abruptly discontinue medication without physician guidance except in cases of sudden onset or worsening of SI.        Pregnancy Warning:  Patient denies current pregnancy possibility.  Patient made aware that medications have not been proven safe in pregnancy and that she must maintain adequate birth control.  Patient instructed to alert us immediately if she becomes pregnant.      Antipsychotic Initiation: Typical ÁLVARO's reviewed including weight gain, abnormal movements, EPS, TD, metabolic side effects.

## 2023-12-26 ENCOUNTER — PATIENT MESSAGE (OUTPATIENT)
Dept: PSYCHIATRY | Facility: CLINIC | Age: 23
End: 2023-12-26
Payer: COMMERCIAL

## 2023-12-26 ENCOUNTER — OFFICE VISIT (OUTPATIENT)
Dept: PSYCHIATRY | Facility: CLINIC | Age: 23
End: 2023-12-26
Payer: COMMERCIAL

## 2023-12-26 DIAGNOSIS — F41.0 PANIC DISORDER: ICD-10-CM

## 2023-12-26 DIAGNOSIS — F41.9 ANXIETY DISORDER, UNSPECIFIED TYPE: ICD-10-CM

## 2023-12-26 DIAGNOSIS — F31.81 BIPOLAR II DISORDER, MOST RECENT EPISODE MAJOR DEPRESSIVE: Primary | ICD-10-CM

## 2023-12-26 PROCEDURE — 1159F MED LIST DOCD IN RCRD: CPT | Mod: CPTII,95,, | Performed by: PSYCHOLOGIST

## 2023-12-26 PROCEDURE — 1159F PR MEDICATION LIST DOCUMENTED IN MEDICAL RECORD: ICD-10-PCS | Mod: CPTII,95,, | Performed by: PSYCHOLOGIST

## 2023-12-26 PROCEDURE — 3044F PR MOST RECENT HEMOGLOBIN A1C LEVEL <7.0%: ICD-10-PCS | Mod: CPTII,95,, | Performed by: PSYCHOLOGIST

## 2023-12-26 PROCEDURE — 99214 PR OFFICE/OUTPT VISIT, EST, LEVL IV, 30-39 MIN: ICD-10-PCS | Mod: 95,,, | Performed by: PSYCHOLOGIST

## 2023-12-26 PROCEDURE — 3044F HG A1C LEVEL LT 7.0%: CPT | Mod: CPTII,95,, | Performed by: PSYCHOLOGIST

## 2023-12-26 PROCEDURE — 99214 OFFICE O/P EST MOD 30 MIN: CPT | Mod: 95,,, | Performed by: PSYCHOLOGIST

## 2023-12-26 RX ORDER — DESVENLAFAXINE 100 MG/1
100 TABLET, EXTENDED RELEASE ORAL DAILY
Qty: 30 TABLET | Refills: 1 | Status: SHIPPED | OUTPATIENT
Start: 2023-12-26 | End: 2024-02-27 | Stop reason: SDUPTHER

## 2023-12-26 RX ORDER — ARIPIPRAZOLE 5 MG/1
5 TABLET ORAL DAILY
Qty: 30 TABLET | Refills: 1 | Status: SHIPPED | OUTPATIENT
Start: 2023-12-26 | End: 2024-02-27 | Stop reason: SDUPTHER

## 2024-02-27 ENCOUNTER — OFFICE VISIT (OUTPATIENT)
Dept: PSYCHIATRY | Facility: CLINIC | Age: 24
End: 2024-02-27
Payer: COMMERCIAL

## 2024-02-27 DIAGNOSIS — F41.9 ANXIETY DISORDER, UNSPECIFIED TYPE: ICD-10-CM

## 2024-02-27 DIAGNOSIS — F41.0 PANIC DISORDER: ICD-10-CM

## 2024-02-27 DIAGNOSIS — F31.81 BIPOLAR II DISORDER, MOST RECENT EPISODE MAJOR DEPRESSIVE: Primary | ICD-10-CM

## 2024-02-27 PROCEDURE — 90833 PSYTX W PT W E/M 30 MIN: CPT | Mod: 95,,, | Performed by: PSYCHOLOGIST

## 2024-02-27 PROCEDURE — 99214 OFFICE O/P EST MOD 30 MIN: CPT | Mod: 95,,, | Performed by: PSYCHOLOGIST

## 2024-02-27 PROCEDURE — 1159F MED LIST DOCD IN RCRD: CPT | Mod: CPTII,95,, | Performed by: PSYCHOLOGIST

## 2024-02-27 RX ORDER — DESVENLAFAXINE 100 MG/1
100 TABLET, EXTENDED RELEASE ORAL DAILY
Qty: 180 TABLET | Refills: 0 | Status: SHIPPED | OUTPATIENT
Start: 2024-02-27 | End: 2025-02-26

## 2024-02-27 RX ORDER — BUSPIRONE HYDROCHLORIDE 5 MG/1
5 TABLET ORAL 2 TIMES DAILY
Qty: 360 TABLET | Refills: 0 | Status: SHIPPED | OUTPATIENT
Start: 2024-02-27 | End: 2025-02-26

## 2024-02-27 RX ORDER — ARIPIPRAZOLE 5 MG/1
5 TABLET ORAL DAILY
Qty: 180 TABLET | Refills: 0 | Status: SHIPPED | OUTPATIENT
Start: 2024-02-27 | End: 2025-02-26

## 2024-02-27 NOTE — PROGRESS NOTES
The patient location is:  [place], [place]  The patient location [place] is: [place]  The patient phone number is: [phone]  Visit type: Virtual visit with synchronous audio and video  Each patient to whom he or she provides medical services by telemedicine is:  (1) informed of the relationship between the physician and patient and the respective role of any other health care provider with respect to management of the patient; and (2) notified that he or she may decline to receive medical services by telemedicine and may withdraw from such care at any time.     Outpatient Psychiatry Follow-Up Visit    Clinical Status of Patient: Outpatient (Ambulatory)  02/27/2024     Chief Complaint:  presenting today for a follow-up.       Interval History and Content of Current Session:  Interim Events/Subjective Report/Content of Current Session:  follow-up appointment.    Pt is a 22 y/o female with past psychiatric hx of Bipolar Disorder and anxiety who presents for follow-up treatment. Pt reported that she has been having panic attacks almost everyday. Thinks it is stress induced. No specific trigger for the panic attacks. Stated that she has been getting overwhelmed easier and more frequently. Pt noted that she is out of practice with coping as she has been more stable with medications. Pt reported that she will be moving within the month for a new job. Has housing established but insurance will take 90 days. Pt stated that she is excited and thinks it will be positive in the long-term but noted ruminative worries daily. No recent alon/hypomania.     Past Psychiatric hx: fluoxetine, lamotrigine 150 mg BID, trazodone (VH), quetiapine (gained weight, increased depression), Zoloft (diarrhea), Lexapro     Past Medical hx:   Past Medical History:   Diagnosis Date    Anxiety     Depression     Pneumonia 9/12        Interim hx:  Medication changes last visit: Increase Pristiq 100 mg, Increase Aripiprazole to 5 mg  Anxiety:  increased  Depression: similar to previous visit     Denies suicidal/homicidal ideations.  Denies hopelessness/worthlessness.    Denies auditory/visual hallucinations      Alcohol: Infrequent use  Drug: Pt denies  Tobacco: Pt denies      Review of Systems   PSYCHIATRIC: Pertinent items are noted in the narrative.        CONSTITUTIONAL: weight stable    Past Medical, Family and Social History: The patient's past medical, family and social history have been reviewed and updated as appropriate within the electronic medical record. See encounter notes.     Current Psychiatric Medication:  Pristiq 100 mg, aripiprazole 5 mg     Compliance: yes      Side effects: Pt denied     Risk Parameters:  Patient reports no suicidal ideation  Patient reports no homicidal ideation  Patient reports no self-injurious behavior  Patient reports no violent behavior     Exam (detailed: at least 9 elements; comprehensive: all 15 elements)   Constitutional  Vitals:  Most recent vital signs, dated less than 90 days prior to this appointment, were reviewed. BP: ()/()   Arterial Line BP: ()/()       General:  unremarkable, age appropriate, casual attire, good eye contact, good rapport       Musculoskeletal  Muscle Strength/Tone:  no flaccidity, no tremor    Gait & Station:  normal      Psychiatric                       Speech:  normal tone, normal rate, rhythm, and volume   Mood & Affect:   Mildly Depressed, anxious         Thought Process:   Goal directed; Linear    Associations:   intact   Thought Content:   No SI/HI, delusions, or paranoia, no AV/VH   Insight & Judgement:   Good, adequate to circumstances   Orientation:   grossly intact; alert and oriented x 4    Memory:  intact for content of interview    Language:  grossly intact, can repeat    Attention Span  : Grossly intact for content of interview   Fund of Knowledge:   intact and appropriate to age and level of education        Assessment and Diagnosis   Status/Progress: Based on the  examination today, the patient's problem(s) is/are adequately controlled.  New problems have not been presented today. Co-morbidities are not complicating management of the primary condition. There are no active rule-out diagnoses for this patient at this time.      Impression: Pt appears to be responding the trials of the medication as her symptoms improved prior to stressors. Discussed deep-breathing and coping for panic attacks. CBT techniques to manage rumination. Will start trial of buspirone for anxiety and encouraged pt to get established with psych ASAP.     Diagnosis:   1) Bipolar II Disorder, MRE Depressed  2) Anxiety Disorder  3) Panic Disorder  4) Hx of PTSD  Intervention/Counseling/Treatment Plan   Medication Management:      1. Pristiq 100 mg     2. Aripiprazole to 5 mg    3. Start a trial of buspirone 5 mg      3. Call to report any worsening of symptoms or problems with the medication. Pt instructed to go to ER with thoughts of harming self, others     4. Patient given contact # for psychotherapists at [place] and/or [facility] and also instructed to check with insurance for list of providers.     Psychotherapy: 20 minutes  Target symptoms: panic  Why chosen therapy is appropriate versus another modality: CBT used; relevant to diagnosis, patient responds to this modality  Outcome monitoring methods: self-report, observation  Therapeutic intervention type: Cognitive Behavioral Therapy, coping strategies, deep-breathing exercises  Topics discussed/themes: building skills sets for symptom management, symptom recognition, nutrition, exercise  The patient's response to the intervention is   Patient's response to treatment is: good.   The patient's progress toward treatment goals: improving     Return to clinic: as needed    -Cognitive-Behavioral/Supportive therapy and psychoeducation provided  -R/B/SE's of medications discussed with the pt who expresses understanding and chooses to take  medications as prescribed.   -Pt instructed to call clinic, 911 or go to nearest emergency room if sxs worsen or pt is in   crisis. The pt expresses understanding.    Damon Orona, PhD, MP     Antidepressant/Antianxiety Medication Initiation:  Patient informed of risks, benefits, and potential side effects of medication and accepts informed consent.  Common side effects include nausea, fatigue, headache, insomnia., Specifically discussed the possibility of new or worsening suicidal thoughts/depression.  Patient instructed to stop the medication immediately and seek urgent treatment if this occurs. Patient instructed not to abruptly discontinue medication without physician guidance except in cases of sudden onset or worsening of SI.        Pregnancy Warning:  Patient denies current pregnancy possibility.  Patient made aware that medications have not been proven safe in pregnancy and that she must maintain adequate birth control.  Patient instructed to alert us immediately if she becomes pregnant.      Antipsychotic Initiation: Typical ÁLVARO's reviewed including weight gain, abnormal movements, EPS, TD, metabolic side effects.

## 2024-03-11 ENCOUNTER — PATIENT MESSAGE (OUTPATIENT)
Dept: ADMINISTRATIVE | Facility: HOSPITAL | Age: 24
End: 2024-03-11
Payer: COMMERCIAL

## 2024-04-04 ENCOUNTER — PATIENT MESSAGE (OUTPATIENT)
Dept: ADMINISTRATIVE | Facility: HOSPITAL | Age: 24
End: 2024-04-04
Payer: COMMERCIAL

## 2024-08-05 ENCOUNTER — PATIENT MESSAGE (OUTPATIENT)
Dept: ADMINISTRATIVE | Facility: HOSPITAL | Age: 24
End: 2024-08-05
Payer: COMMERCIAL